# Patient Record
Sex: FEMALE | Race: BLACK OR AFRICAN AMERICAN | Employment: UNEMPLOYED | URBAN - METROPOLITAN AREA
[De-identification: names, ages, dates, MRNs, and addresses within clinical notes are randomized per-mention and may not be internally consistent; named-entity substitution may affect disease eponyms.]

---

## 2018-04-09 ENCOUNTER — ED HISTORICAL/CONVERTED ENCOUNTER (OUTPATIENT)
Dept: OTHER | Age: 58
End: 2018-04-09

## 2018-05-02 ENCOUNTER — IP HISTORICAL/CONVERTED ENCOUNTER (OUTPATIENT)
Dept: OTHER | Age: 58
End: 2018-05-02

## 2021-09-04 ENCOUNTER — HOSPITAL ENCOUNTER (INPATIENT)
Age: 61
LOS: 8 days | Discharge: HOME | DRG: 871 | End: 2021-09-12
Attending: EMERGENCY MEDICINE | Admitting: FAMILY MEDICINE
Payer: MEDICARE

## 2021-09-04 ENCOUNTER — APPOINTMENT (OUTPATIENT)
Dept: GENERAL RADIOLOGY | Age: 61
DRG: 871 | End: 2021-09-04
Attending: EMERGENCY MEDICINE
Payer: MEDICARE

## 2021-09-04 DIAGNOSIS — J12.82 PNEUMONIA DUE TO COVID-19 VIRUS: Primary | ICD-10-CM

## 2021-09-04 DIAGNOSIS — N18.9 CHRONIC KIDNEY DISEASE, UNSPECIFIED CKD STAGE: ICD-10-CM

## 2021-09-04 DIAGNOSIS — U07.1 PNEUMONIA DUE TO COVID-19 VIRUS: Primary | ICD-10-CM

## 2021-09-04 LAB
ALBUMIN SERPL-MCNC: 3.3 G/DL (ref 3.5–5)
ALBUMIN/GLOB SERPL: 0.6 {RATIO} (ref 1.1–2.2)
ALP SERPL-CCNC: 51 U/L (ref 45–117)
ALT SERPL-CCNC: 17 U/L (ref 12–78)
ANION GAP SERPL CALC-SCNC: 15 MMOL/L (ref 5–15)
AST SERPL-CCNC: 54 U/L (ref 15–37)
BASOPHILS # BLD: 0 K/UL (ref 0–0.1)
BASOPHILS NFR BLD: 0 % (ref 0–1)
BILIRUB SERPL-MCNC: 0.7 MG/DL (ref 0.2–1)
BUN SERPL-MCNC: 105 MG/DL (ref 6–20)
BUN/CREAT SERPL: 21 (ref 12–20)
CALCIUM SERPL-MCNC: 9.1 MG/DL (ref 8.5–10.1)
CHLORIDE SERPL-SCNC: 101 MMOL/L (ref 97–108)
CO2 SERPL-SCNC: 23 MMOL/L (ref 21–32)
CREAT SERPL-MCNC: 5.03 MG/DL (ref 0.55–1.02)
CRP SERPL-MCNC: 1.57 MG/DL (ref 0–0.6)
DIFFERENTIAL METHOD BLD: ABNORMAL
EOSINOPHIL # BLD: 0 K/UL (ref 0–0.4)
EOSINOPHIL NFR BLD: 0 % (ref 0–7)
ERYTHROCYTE [DISTWIDTH] IN BLOOD BY AUTOMATED COUNT: 13 % (ref 11.5–14.5)
FLUAV RNA SPEC QL NAA+PROBE: NOT DETECTED
FLUBV RNA SPEC QL NAA+PROBE: NOT DETECTED
GLOBULIN SER CALC-MCNC: 5.1 G/DL (ref 2–4)
GLUCOSE SERPL-MCNC: 127 MG/DL (ref 65–100)
HCT VFR BLD AUTO: 29.7 % (ref 35–47)
HGB BLD-MCNC: 9.6 G/DL (ref 11.5–16)
IMM GRANULOCYTES # BLD AUTO: 0.1 K/UL (ref 0–0.04)
IMM GRANULOCYTES NFR BLD AUTO: 2 % (ref 0–0.5)
LACTATE SERPL-SCNC: 1.4 MMOL/L (ref 0.4–2)
LDH SERPL L TO P-CCNC: 669 U/L (ref 81–246)
LYMPHOCYTES # BLD: 1 K/UL (ref 0.8–3.5)
LYMPHOCYTES NFR BLD: 11 % (ref 12–49)
MCH RBC QN AUTO: 31.9 PG (ref 26–34)
MCHC RBC AUTO-ENTMCNC: 32.3 G/DL (ref 30–36.5)
MCV RBC AUTO: 98.7 FL (ref 80–99)
MONOCYTES # BLD: 0.8 K/UL (ref 0–1)
MONOCYTES NFR BLD: 9 % (ref 5–13)
NEUTS SEG # BLD: 6.9 K/UL (ref 1.8–8)
NEUTS SEG NFR BLD: 78 % (ref 32–75)
NRBC # BLD: 0.06 K/UL (ref 0–0.01)
NRBC BLD-RTO: 0.7 PER 100 WBC
PLATELET # BLD AUTO: 231 K/UL (ref 150–400)
PMV BLD AUTO: 10.3 FL (ref 8.9–12.9)
POTASSIUM SERPL-SCNC: 4.3 MMOL/L (ref 3.5–5.1)
PROT SERPL-MCNC: 8.4 G/DL (ref 6.4–8.2)
RBC # BLD AUTO: 3.01 M/UL (ref 3.8–5.2)
SARS-COV-2, COV2: DETECTED
SODIUM SERPL-SCNC: 139 MMOL/L (ref 136–145)
TROPONIN I SERPL-MCNC: <0.05 NG/ML
WBC # BLD AUTO: 8.9 K/UL (ref 3.6–11)

## 2021-09-04 PROCEDURE — 87636 SARSCOV2 & INF A&B AMP PRB: CPT

## 2021-09-04 PROCEDURE — 84484 ASSAY OF TROPONIN QUANT: CPT

## 2021-09-04 PROCEDURE — 84145 PROCALCITONIN (PCT): CPT

## 2021-09-04 PROCEDURE — 71045 X-RAY EXAM CHEST 1 VIEW: CPT

## 2021-09-04 PROCEDURE — 87040 BLOOD CULTURE FOR BACTERIA: CPT

## 2021-09-04 PROCEDURE — 74011250637 HC RX REV CODE- 250/637: Performed by: EMERGENCY MEDICINE

## 2021-09-04 PROCEDURE — 99285 EMERGENCY DEPT VISIT HI MDM: CPT

## 2021-09-04 PROCEDURE — 96374 THER/PROPH/DIAG INJ IV PUSH: CPT

## 2021-09-04 PROCEDURE — 93005 ELECTROCARDIOGRAM TRACING: CPT

## 2021-09-04 PROCEDURE — 85025 COMPLETE CBC W/AUTO DIFF WBC: CPT

## 2021-09-04 PROCEDURE — 86140 C-REACTIVE PROTEIN: CPT

## 2021-09-04 PROCEDURE — 94640 AIRWAY INHALATION TREATMENT: CPT

## 2021-09-04 PROCEDURE — 36415 COLL VENOUS BLD VENIPUNCTURE: CPT

## 2021-09-04 PROCEDURE — 80053 COMPREHEN METABOLIC PANEL: CPT

## 2021-09-04 PROCEDURE — 77010033678 HC OXYGEN DAILY

## 2021-09-04 PROCEDURE — 83605 ASSAY OF LACTIC ACID: CPT

## 2021-09-04 PROCEDURE — 65270000029 HC RM PRIVATE

## 2021-09-04 PROCEDURE — 83615 LACTATE (LD) (LDH) ENZYME: CPT

## 2021-09-04 PROCEDURE — 74011250636 HC RX REV CODE- 250/636: Performed by: EMERGENCY MEDICINE

## 2021-09-04 RX ORDER — ALBUTEROL SULFATE 90 UG/1
10 AEROSOL, METERED RESPIRATORY (INHALATION) ONCE
Status: COMPLETED | OUTPATIENT
Start: 2021-09-04 | End: 2021-09-04

## 2021-09-04 RX ORDER — DEXAMETHASONE SODIUM PHOSPHATE 100 MG/10ML
6 INJECTION INTRAMUSCULAR; INTRAVENOUS
Status: COMPLETED | OUTPATIENT
Start: 2021-09-04 | End: 2021-09-04

## 2021-09-04 RX ORDER — ACETAMINOPHEN 500 MG
1000 TABLET ORAL
Status: COMPLETED | OUTPATIENT
Start: 2021-09-04 | End: 2021-09-04

## 2021-09-04 RX ADMIN — SODIUM CHLORIDE 1000 ML: 9 INJECTION, SOLUTION INTRAVENOUS at 18:41

## 2021-09-04 RX ADMIN — DEXAMETHASONE SODIUM PHOSPHATE 6 MG: 10 INJECTION INTRAMUSCULAR; INTRAVENOUS at 18:38

## 2021-09-04 RX ADMIN — ACETAMINOPHEN 1000 MG: 500 TABLET ORAL at 18:41

## 2021-09-04 RX ADMIN — ALBUTEROL SULFATE 10 PUFF: 90 AEROSOL, METERED RESPIRATORY (INHALATION) at 18:39

## 2021-09-04 NOTE — ED NOTES
Bedside and Verbal shift change report given to Valentino Martinez RN (oncoming nurse) by Radames Etienne RN (offgoing nurse). Report included the following information SBAR, ED Summary, MAR and Recent Results.

## 2021-09-04 NOTE — ED NOTES
Pt presents to ED ambulatory complaining of testing positive for Covid 19 with a home test 3 days ago and reporting increased shortness of breath along with increasing weakness. Pt is alert and oriented x 4, RR even and unlabored, skin is warm and dry. Assessment completed and pt updated on plan of care. Call bell in reach. Emergency Department Nursing Plan of Care       The Nursing Plan of Care is developed from the Nursing assessment and Emergency Department Attending provider initial evaluation. The plan of care may be reviewed in the ED Provider note.     The Plan of Care was developed with the following considerations:   Patient / Family readiness to learn indicated by:verbalized understanding  Persons(s) to be included in education: patient  Barriers to Learning/Limitations:No    Signed     Rafia Brito RN    9/4/2021   6:20 PM

## 2021-09-05 LAB
ALBUMIN SERPL-MCNC: 3.1 G/DL (ref 3.5–5)
ALBUMIN/GLOB SERPL: 0.6 {RATIO} (ref 1.1–2.2)
ALP SERPL-CCNC: 55 U/L (ref 45–117)
ALT SERPL-CCNC: 18 U/L (ref 12–78)
ANION GAP SERPL CALC-SCNC: 13 MMOL/L (ref 5–15)
AST SERPL-CCNC: 46 U/L (ref 15–37)
BILIRUB SERPL-MCNC: 0.6 MG/DL (ref 0.2–1)
BUN SERPL-MCNC: 91 MG/DL (ref 6–20)
BUN/CREAT SERPL: 21 (ref 12–20)
CALCIUM SERPL-MCNC: 9.1 MG/DL (ref 8.5–10.1)
CHLORIDE SERPL-SCNC: 106 MMOL/L (ref 97–108)
CO2 SERPL-SCNC: 22 MMOL/L (ref 21–32)
CREAT SERPL-MCNC: 4.42 MG/DL (ref 0.55–1.02)
D DIMER PPP FEU-MCNC: 2.3 MG/L FEU (ref 0–0.65)
ERYTHROCYTE [DISTWIDTH] IN BLOOD BY AUTOMATED COUNT: 13.1 % (ref 11.5–14.5)
FERRITIN SERPL-MCNC: ABNORMAL NG/ML (ref 8–252)
GLOBULIN SER CALC-MCNC: 5.3 G/DL (ref 2–4)
GLUCOSE SERPL-MCNC: 169 MG/DL (ref 65–100)
HCT VFR BLD AUTO: 28.9 % (ref 35–47)
HGB BLD-MCNC: 9.3 G/DL (ref 11.5–16)
LACTATE SERPL-SCNC: 1.4 MMOL/L (ref 0.4–2)
MCH RBC QN AUTO: 31.6 PG (ref 26–34)
MCHC RBC AUTO-ENTMCNC: 32.2 G/DL (ref 30–36.5)
MCV RBC AUTO: 98.3 FL (ref 80–99)
NRBC # BLD: 0.06 K/UL (ref 0–0.01)
NRBC BLD-RTO: 0.8 PER 100 WBC
PLATELET # BLD AUTO: 252 K/UL (ref 150–400)
PMV BLD AUTO: 10.2 FL (ref 8.9–12.9)
POTASSIUM SERPL-SCNC: 4.1 MMOL/L (ref 3.5–5.1)
PROCALCITONIN SERPL-MCNC: 0.54 NG/ML
PROT SERPL-MCNC: 8.4 G/DL (ref 6.4–8.2)
RBC # BLD AUTO: 2.94 M/UL (ref 3.8–5.2)
SODIUM SERPL-SCNC: 141 MMOL/L (ref 136–145)
TROPONIN I SERPL-MCNC: 0.05 NG/ML
WBC # BLD AUTO: 7.9 K/UL (ref 3.6–11)

## 2021-09-05 PROCEDURE — 84484 ASSAY OF TROPONIN QUANT: CPT

## 2021-09-05 PROCEDURE — 85027 COMPLETE CBC AUTOMATED: CPT

## 2021-09-05 PROCEDURE — 74011250636 HC RX REV CODE- 250/636: Performed by: STUDENT IN AN ORGANIZED HEALTH CARE EDUCATION/TRAINING PROGRAM

## 2021-09-05 PROCEDURE — 83605 ASSAY OF LACTIC ACID: CPT

## 2021-09-05 PROCEDURE — 77010033678 HC OXYGEN DAILY

## 2021-09-05 PROCEDURE — 82728 ASSAY OF FERRITIN: CPT

## 2021-09-05 PROCEDURE — 80053 COMPREHEN METABOLIC PANEL: CPT

## 2021-09-05 PROCEDURE — 65270000032 HC RM SEMIPRIVATE

## 2021-09-05 PROCEDURE — 85379 FIBRIN DEGRADATION QUANT: CPT

## 2021-09-05 PROCEDURE — 74011250637 HC RX REV CODE- 250/637: Performed by: STUDENT IN AN ORGANIZED HEALTH CARE EDUCATION/TRAINING PROGRAM

## 2021-09-05 PROCEDURE — 74011000258 HC RX REV CODE- 258: Performed by: STUDENT IN AN ORGANIZED HEALTH CARE EDUCATION/TRAINING PROGRAM

## 2021-09-05 PROCEDURE — 74011250636 HC RX REV CODE- 250/636: Performed by: FAMILY MEDICINE

## 2021-09-05 PROCEDURE — 36415 COLL VENOUS BLD VENIPUNCTURE: CPT

## 2021-09-05 RX ORDER — POLYETHYLENE GLYCOL 3350 17 G/17G
17 POWDER, FOR SOLUTION ORAL DAILY PRN
Status: DISCONTINUED | OUTPATIENT
Start: 2021-09-05 | End: 2021-09-05 | Stop reason: SDUPTHER

## 2021-09-05 RX ORDER — ASCORBIC ACID 500 MG
500 TABLET ORAL 2 TIMES DAILY
Status: DISCONTINUED | OUTPATIENT
Start: 2021-09-05 | End: 2021-09-12 | Stop reason: HOSPADM

## 2021-09-05 RX ORDER — ONDANSETRON 2 MG/ML
4 INJECTION INTRAMUSCULAR; INTRAVENOUS
Status: DISCONTINUED | OUTPATIENT
Start: 2021-09-05 | End: 2021-09-12 | Stop reason: HOSPADM

## 2021-09-05 RX ORDER — ONDANSETRON 4 MG/1
4 TABLET, ORALLY DISINTEGRATING ORAL
Status: DISCONTINUED | OUTPATIENT
Start: 2021-09-05 | End: 2021-09-12 | Stop reason: HOSPADM

## 2021-09-05 RX ORDER — ALLOPURINOL 100 MG/1
150 TABLET ORAL DAILY
COMMUNITY
Start: 2021-08-14 | End: 2021-09-12

## 2021-09-05 RX ORDER — SODIUM CHLORIDE 0.9 % (FLUSH) 0.9 %
5-40 SYRINGE (ML) INJECTION AS NEEDED
Status: DISCONTINUED | OUTPATIENT
Start: 2021-09-05 | End: 2021-09-05 | Stop reason: SDUPTHER

## 2021-09-05 RX ORDER — DEXAMETHASONE SODIUM PHOSPHATE 100 MG/10ML
6 INJECTION INTRAMUSCULAR; INTRAVENOUS EVERY 24 HOURS
Status: DISCONTINUED | OUTPATIENT
Start: 2021-09-05 | End: 2021-09-07 | Stop reason: ALTCHOICE

## 2021-09-05 RX ORDER — GUAIFENESIN/DEXTROMETHORPHAN 100-10MG/5
5 SYRUP ORAL
Status: DISCONTINUED | OUTPATIENT
Start: 2021-09-05 | End: 2021-09-12 | Stop reason: HOSPADM

## 2021-09-05 RX ORDER — ZINC SULFATE 50(220)MG
1 CAPSULE ORAL DAILY
Status: DISCONTINUED | OUTPATIENT
Start: 2021-09-05 | End: 2021-09-12 | Stop reason: HOSPADM

## 2021-09-05 RX ORDER — ACETAMINOPHEN 650 MG/1
650 SUPPOSITORY RECTAL
Status: DISCONTINUED | OUTPATIENT
Start: 2021-09-05 | End: 2021-09-12 | Stop reason: HOSPADM

## 2021-09-05 RX ORDER — SODIUM CHLORIDE 0.9 % (FLUSH) 0.9 %
5-40 SYRINGE (ML) INJECTION AS NEEDED
Status: DISCONTINUED | OUTPATIENT
Start: 2021-09-05 | End: 2021-09-12 | Stop reason: HOSPADM

## 2021-09-05 RX ORDER — ACETAMINOPHEN 325 MG/1
650 TABLET ORAL
Status: DISCONTINUED | OUTPATIENT
Start: 2021-09-05 | End: 2021-09-12 | Stop reason: HOSPADM

## 2021-09-05 RX ORDER — ALBUTEROL SULFATE 90 UG/1
2 AEROSOL, METERED RESPIRATORY (INHALATION)
Status: DISCONTINUED | OUTPATIENT
Start: 2021-09-05 | End: 2021-09-12 | Stop reason: HOSPADM

## 2021-09-05 RX ORDER — ENOXAPARIN SODIUM 100 MG/ML
30 INJECTION SUBCUTANEOUS EVERY 12 HOURS
Status: DISCONTINUED | OUTPATIENT
Start: 2021-09-05 | End: 2021-09-05 | Stop reason: DRUGHIGH

## 2021-09-05 RX ORDER — TORSEMIDE 20 MG/1
20 TABLET ORAL DAILY
Status: ON HOLD | COMMUNITY
Start: 2021-06-03 | End: 2021-09-05

## 2021-09-05 RX ORDER — TAMSULOSIN HYDROCHLORIDE 0.4 MG/1
0.4 CAPSULE ORAL DAILY
Status: DISCONTINUED | OUTPATIENT
Start: 2021-09-05 | End: 2021-09-12 | Stop reason: HOSPADM

## 2021-09-05 RX ORDER — HEPARIN SODIUM 5000 [USP'U]/ML
5000 INJECTION, SOLUTION INTRAVENOUS; SUBCUTANEOUS EVERY 8 HOURS
Status: DISCONTINUED | OUTPATIENT
Start: 2021-09-05 | End: 2021-09-12 | Stop reason: HOSPADM

## 2021-09-05 RX ORDER — AZITHROMYCIN 250 MG/1
500 TABLET, FILM COATED ORAL DAILY
Status: COMPLETED | OUTPATIENT
Start: 2021-09-05 | End: 2021-09-08

## 2021-09-05 RX ORDER — POLYETHYLENE GLYCOL 3350 17 G/17G
17 POWDER, FOR SOLUTION ORAL DAILY PRN
Status: DISCONTINUED | OUTPATIENT
Start: 2021-09-05 | End: 2021-09-12 | Stop reason: HOSPADM

## 2021-09-05 RX ORDER — SODIUM CHLORIDE 0.9 % (FLUSH) 0.9 %
5-40 SYRINGE (ML) INJECTION EVERY 8 HOURS
Status: DISCONTINUED | OUTPATIENT
Start: 2021-09-05 | End: 2021-09-06 | Stop reason: SDUPTHER

## 2021-09-05 RX ORDER — METOPROLOL TARTRATE 50 MG/1
50 TABLET ORAL 2 TIMES DAILY
Status: ON HOLD | COMMUNITY
Start: 2021-06-01 | End: 2021-09-12 | Stop reason: SDUPTHER

## 2021-09-05 RX ORDER — LOSARTAN POTASSIUM 50 MG/1
50 TABLET ORAL DAILY
COMMUNITY
Start: 2021-06-18 | End: 2021-09-12

## 2021-09-05 RX ORDER — MELATONIN
1000 DAILY
Status: ON HOLD | COMMUNITY
Start: 2021-06-03 | End: 2021-09-05

## 2021-09-05 RX ORDER — SODIUM CHLORIDE 0.9 % (FLUSH) 0.9 %
5-40 SYRINGE (ML) INJECTION EVERY 8 HOURS
Status: DISCONTINUED | OUTPATIENT
Start: 2021-09-05 | End: 2021-09-12 | Stop reason: HOSPADM

## 2021-09-05 RX ORDER — AMLODIPINE BESYLATE 10 MG/1
10 TABLET ORAL DAILY
COMMUNITY
Start: 2021-06-18

## 2021-09-05 RX ADMIN — Medication 10 ML: at 22:58

## 2021-09-05 RX ADMIN — DEXAMETHASONE SODIUM PHOSPHATE 6 MG: 10 INJECTION INTRAMUSCULAR; INTRAVENOUS at 14:25

## 2021-09-05 RX ADMIN — Medication 10 ML: at 06:00

## 2021-09-05 RX ADMIN — CEFTRIAXONE SODIUM 1 G: 1 INJECTION, POWDER, FOR SOLUTION INTRAMUSCULAR; INTRAVENOUS at 11:06

## 2021-09-05 RX ADMIN — ENOXAPARIN SODIUM 30 MG: 30 INJECTION SUBCUTANEOUS at 05:05

## 2021-09-05 RX ADMIN — Medication 10 ML: at 14:26

## 2021-09-05 RX ADMIN — Medication 1 CAPSULE: at 11:05

## 2021-09-05 RX ADMIN — HEPARIN SODIUM 5000 UNITS: 5000 INJECTION INTRAVENOUS; SUBCUTANEOUS at 14:25

## 2021-09-05 RX ADMIN — OXYCODONE HYDROCHLORIDE AND ACETAMINOPHEN 500 MG: 500 TABLET ORAL at 18:06

## 2021-09-05 RX ADMIN — TAMSULOSIN HYDROCHLORIDE 0.4 MG: 0.4 CAPSULE ORAL at 18:06

## 2021-09-05 RX ADMIN — AZITHROMYCIN 500 MG: 250 TABLET, FILM COATED ORAL at 11:06

## 2021-09-05 RX ADMIN — OXYCODONE HYDROCHLORIDE AND ACETAMINOPHEN 500 MG: 500 TABLET ORAL at 11:05

## 2021-09-05 RX ADMIN — HEPARIN SODIUM 5000 UNITS: 5000 INJECTION INTRAVENOUS; SUBCUTANEOUS at 22:48

## 2021-09-05 NOTE — ED NOTES
TRANSFER - OUT REPORT:    Verbal report given to Jhon Waterman RN (name) on Radha Posadas  being transferred to PCU (unit) for routine progression of care       Report consisted of patients Situation, Background, Assessment and   Recommendations(SBAR). Information from the following report(s) SBAR, ED Summary, Intake/Output, MAR and Recent Results was reviewed with the receiving nurse. Lines:   Peripheral IV 09/04/21 Right Hand (Active)   Site Assessment Clean, dry, & intact 09/04/21 1818   Phlebitis Assessment 0 09/04/21 1818   Infiltration Assessment 0 09/04/21 1818   Dressing Status Clean, dry, & intact 09/04/21 1818   Dressing Type Transparent 09/04/21 1818   Hub Color/Line Status Blue;Flushed 09/04/21 1818        Opportunity for questions and clarification was provided.       Patient transported with:   Monitor  Registered Nurse, 02 @4[facility]

## 2021-09-05 NOTE — PROGRESS NOTES
Hospitalist Progress Note    NAME: Marin Hunter   :  1960   MRN:  [id number]   Room Number:  Tim Dent  @ [facility]       Interim Hospital Summary: 64 y.o. female whom presented on 2021 with      Assessment / Plan:        #Acute hypoxic respiratory failure due to COVID-19  #COVID-19 pneumonia severe  #Severe sepsis  -Patient was saturating 88 to 90% on room air  -On admission temperature 102, heart rate above 100 respiratory rate 33  -Chest x-ray reviewed independently consistent with severe COVID-19 pneumonia  -COVID-19 PCR positive, CRP 1.57, procalcitonin 0.54, lactic acid within normal limit  troponin 0.05    -Vitamin C and zinc  -Dexamethasone 6 mg for 10 days  -Voluntary prone positioning  -Lovenox per protocol  -Trend symmetry markers  -Ceftriaxone and azithromycin for community-acquired pneumonia given procalcitonin 0.54  -Antitussive  -Antipyretic  -Hold off on December given advanced age CKD  -Supplemental oxygen to keep above SPO2 94      #JADE on CKD 4  -Patient follows [address] [place] [address] nephrology last creatinine in in the [facility] system was 3.94  -On admission creatinine 5.03 down to 4.42 after receiving 1 L of normal saline  -Monitor creatinine daily  -Hold losartan and Diamox  -Ins and outs  -Renal dose medication  -Avoid nephrotoxic l medication  -May nephrology consult    #Hypertension  -Takes amlodipine, metoprolol losartan and Diamox at home  -Resume amlodipine her blood pressure allows    #History of gout on allopurinol at home    #History history of AAA follows with vascular surgery as outpatient        Code status: Full  Prophylaxis: Lovenox  Recommended Disposition: Home w/Family     Subjective:     Chief Complaint / Reason for Physician Visit  Shortness of breath discussed with RN events overnight.      Review of Systems:  No fevers, chills, appetite change, cough, sputum production, shortness of breath, dyspnea on exertion, nausea, vomitting, diarrhea, constipation, chest pain, leg edema, abdominal pain, joint pain, rash, itching. Tolerating PT/OT. Tolerating diet. Objective:     VITALS:   Last 24hrs VS reviewed since prior progress note. Most recent are:  Patient Vitals for the past 24 hrs:   Temp Pulse Resp BP SpO2   09/05/21 0850 98.5 °F (36.9 °C) 79 22 127/62 93 %   09/05/21 0506 98.3 °F (36.8 °C) 97 29 112/65 93 %   09/05/21 0347  79      09/05/21 0320     93 %   09/05/21 0241     (!) 88 %   09/05/21 0206     90 %   09/05/21 0202 99.1 °F (37.3 °C) 98 (!) 32 127/64 90 %   09/05/21 0000  89 (!) 37 129/64 94 %   09/04/21 2345 98.4 °F (36.9 °C) 90 29 128/64 95 %   09/04/21 2300  91 (!) 32 128/64 97 %   09/04/21 2247     96 %   09/04/21 2222  93 29  96 %   09/04/21 2200  94 (!) 35 126/65 96 %   09/04/21 2118 99.2 °F (37.3 °C)       09/04/21 2100  99 (!) 33 128/64 97 %   09/04/21 2000  (!) 101 (!) 42 131/62 96 %   09/04/21 1839  95  (!) 119/50    09/04/21 1808     94 %   09/04/21 1750 (!) 102 °F (38.9 °C)       09/04/21 1747 (!) 102 °F (38.9 °C) 94 18 96/61 90 %       Intake/Output Summary (Last 24 hours) at 9/5/2021 0904  Last data filed at 9/5/2021 0506  Gross per 24 hour   Intake 1000 ml   Output 0 ml   Net 1000 ml        PHYSICAL EXAM:  General: WD, WN. Alert, cooperative, no acute distress    EENT:  EOMI. Anicteric sclerae. MMM  Resp:  CTA bilaterally, no wheezing or rales. No accessory muscle use  CV:  Regular  rhythm,  normal S1/S2, no murmurs rubs gallops, No edema  GI:  Soft, Non distended, Non tender. +Bowel sounds  Neurologic:  Alert and oriented X 3, normal speech,   Psych:   Good insight. Not anxious nor agitated  Skin:  No rashes.   No jaundice    Reviewed most current lab test results and cultures  YES  Reviewed most current radiology test results   YES  Review and summation of old records today    NO  Reviewed patient's current orders and MAR    YES  PMH/SH reviewed - no change compared to H&P  ________________________________________________________________________  Care Plan discussed with:    Comments   Patient x    Family      RN x    Care Manager x    Consultant                       x Multidiciplinary team rounds were held today with , nursing, pharmacist and clinical coordinator. Patient's plan of care was discussed; medications were reviewed and discharge planning was addressed. ________________________________________________________________________  Total NON critical care TIME: 35  Minutes    Total CRITICAL CARE TIME Spent:   Minutes non procedure based      Comments   >50% of visit spent in counseling and coordination of care x    ________________________________________________________________________  Dee Burch MD     Procedures: see electronic medical records for all procedures/Xrays and details which were not copied into this note but were reviewed prior to creation of Plan. LABS:  I reviewed today's most current labs and imaging studies.   Pertinent labs include:  Recent Labs     09/05/21 0218 09/04/21  1833   WBC 7.9 8.9   HGB 9.3* 9.6*   HCT 28.9* 29.7*    231     Recent Labs     09/05/21 0218 09/04/21  1833    139   K 4.1 4.3    101   CO2 22 23   * 127*   BUN 91* 105*   CREA 4.42* 5.03*   CA 9.1 9.1   ALB 3.1* 3.3*   TBILI 0.6 0.7   ALT 18 17       Signed: Dee Burch MD

## 2021-09-05 NOTE — ED NOTES
No changes to pt presentation at this time. Nursing supervisor updated. Pt has been on 4L O2 via NC since 1930 with no changes to resp rate.

## 2021-09-05 NOTE — PROGRESS NOTES
[facility] Admission Pharmacy Medication Reconciliation    Information obtained from:  Patient interview, RX Query  RxQuery data available1:y    Comments/recommendations:    1) The patient was interviewed regarding current PTA medication list, use and drug allergies. The patient is a good historian and was questioned regarding use of any other inhalers, topical products, over the counter medications, herbal medications, vitamin products or ophthalmic/nasal/otic medication use. 2) Medication changes to PTA list:    Added  none  Removed  Butalbital-APAP 50/325 tablet  Cholecalciferol 1000 units tablet  Torsemide 20 mg - MD instructed for her to stop \"a few days ago\"      3) [employer] () was accessed to determine fill history of any controlled medications:  No record of controlled medications dispensed in past 2 years       1RxQuery pharmacy benefit data reflects medications filled and processed through the patient's insurance, however                this data does NOT capture whether the medication was picked up or is currently being taken by the patient. Past Medical History/Disease States:  History reviewed. No pertinent past medical history. Patient allergies: Allergies as of 09/04/2021    (No Known Allergies)         Prior to Admission Medications   Prescriptions Last Dose Informant Patient Reported? Taking?   allopurinoL (ZYLOPRIM) 100 mg tablet 9/2/2021  Yes Yes   Sig: Take 150 mg by mouth daily. amLODIPine (NORVASC) 10 mg tablet 9/2/2021  Yes Yes   Sig: Take 10 mg by mouth daily. losartan (COZAAR) 50 mg tablet 9/2/2021  Yes Yes   Sig: Take 50 mg by mouth daily. metoprolol tartrate (LOPRESSOR) 50 mg tablet 9/2/2021  Yes Yes   Sig: Take 50 mg by mouth two (2) times a day.               Thank you,  Omid Elias, [facility]

## 2021-09-05 NOTE — ED NOTES
Nursing sup concerned that pt may be too acute for unit due to O2 requirements and RR. Asked us to wait a few mins to ensure pt is not decompensating. Recommended that she contact hospitalist if she does not feel pt is appropriate for upstairs.

## 2021-09-05 NOTE — PROGRESS NOTES
[facility] Dosing Services: Anticoagulation    Enoxaparin has been changed to heparin for a CrCl 14.2  mL/min. Pharmacy automatically makes dose adjustments for patients with a CrCl less than 30 mL/min.    64 y.o. female  Indication: prophylaxis of  DVT. Wt Readings from Last 1 Encounters:   09/05/21 75.8 kg (167 lb)       Ht Readings from Last 1 Encounters:   09/04/21 170.2 cm (67\")         Plan:  Enoxaparin changed to heparin 5,000 units subcutaneously every 8 hours. Previous  Regimen Enoxaparin 30 mg subcutaneously q12h   Creatinine Clearance Estimated Creatinine Clearance: 14.2 mL/min (A) (based on SCr of 4.42 mg/dL (H)).     Creatinine Lab Results   Component Value Date/Time    Creatinine 4.42 (H) 09/05/2021 02:18 AM       Platelet Lab Results   Component Value Date/Time    PLATELET 101 65/30/7757 02:18 AM      H/H Lab Results   Component Value Date/Time    HGB 9.3 (L) 09/05/2021 02:18 AM        Markell Nova RPH

## 2021-09-05 NOTE — PROGRESS NOTES
0130: Pt admitted from ED to [facility]. Pt O2 85% once we got her settled in bed. O2 turned up to 5L. Encouraged deep breathing and sitting upright. Disoriented to time, withdrawn but pleasant. 0200: Dual skin assessment completed. Pt incontinent of bowel, soft, brown/mitzy colored. Pt states she has a fistula in her L upper arm but denies dialysis and cannot recall when it was placed. Otherwise skin is intact. Partial bath completed, bed pads changed, electrodes changed and purewick initiated. Pt O2 at this time 92%. 0255: Labs sent. Two nurse attempt, pt is a difficult stick. 0300: RT contacted due to O2 maintaining between 86-92% on 5L. O2 increased to 6L. Humidified oxygen initiated. HR is labile from  however pt is tremulous. Will notify tele MD given that our threshold for nasal cannula is 1-4L on PCU.    0315: Update provided to pts daughter Adryan Frazier. She would like an update from MD today. 0: Spoke with Dr. Yumiko Goff 336, does not want to transfer pt at this point and instructed to escalate to Dr. Brook Saleh at shift change. Currently her HR is 96 and O2 93 still on 6LNC. Will continue to monitor. 0430: Pt tele alarming for -160. On lead II HR is only . Presumably detecting artifact due to pts tremors. 0500: Lovenox given. Resting comfortably. O2 94%, titrated down to [facility]. All other vitals remain stable. 0600: Resting comfortably on 5L maintaining between 88-94%.

## 2021-09-05 NOTE — PROGRESS NOTES
D/w RN, Pt O2 increased to 6LPM while sleeping, maintaining 93-94%. Pt is stable and resting at this time, but will need transfer to higher level of care in AM due to hospital policy requiring patients over 4LPM be transferred.

## 2021-09-05 NOTE — ED PROVIDER NOTES
EMERGENCY DEPARTMENT HISTORY AND PHYSICAL EXAM    Date: 9/4/2021  Patient Name: Calderon Rangel    History of Presenting Illness     Chief Complaint   Patient presents with    Positive For Covid-19         History Provided By: Patient    Chief Complaint: cough  Duration: 3 Days  Timing:  Acute  Quality: dry non productive  Severity: Moderate  Modifying Factors: none  Associated Symptoms: Of breath loss of taste and smell      HPI: Calderon Rangel is a 64 y.o. female with a PMH of CKD state 4 aortic aneurysm who presents with cough in the past 3 days. Patient also states she lost her sense of taste and smell. Patient's daughter purchased a home Covid test and patient and daughter tested positive. Patient states she has become increasingly weak and short of breath. PCP: None    Current Outpatient Medications   Medication Sig Dispense Refill    butalbital-acetaminophen (PHRENILIN)  mg tablet Take 1 Tab by mouth every six (6) hours as needed. (Patient not taking: Reported on 9/4/2021) 20 Tab 0    penicillin v potassium (VEETID) 500 mg tablet Take 1 Tab by mouth four (4) times daily. (Patient not taking: Reported on 9/4/2021) 40 Tab 0       Past History     Past Medical History:  History reviewed. No pertinent past medical history. Past Surgical History:  History reviewed. No pertinent surgical history. Family History:  History reviewed. No pertinent family history. Social History:  Social History     Tobacco Use    Smoking status: Never Smoker    Smokeless tobacco: Never Used   Substance Use Topics    Alcohol use: No    Drug use: No       Allergies:  No Known Allergies      Review of Systems   Review of Systems   Constitutional: Negative for fatigue and fever. HENT:        Loss of taste and smell   Respiratory: Positive for cough and shortness of breath. Negative for wheezing. Cardiovascular: Negative for chest pain. Gastrointestinal: Negative for abdominal pain. Musculoskeletal: Negative for arthralgias, myalgias, neck pain and neck stiffness. Skin: Negative for pallor and rash. Neurological: Negative for dizziness, tremors and headaches. All other systems reviewed and are negative. Physical Exam     Vitals:    09/04/21 1750 09/04/21 1808 09/04/21 1839 09/04/21 2000   BP:   (!) 119/50 131/62   Pulse:   95 (!) 101   Resp:    (!) 42   Temp: (!) 102 °F (38.9 °C)      SpO2:  94%  96%   Weight:       Height:         Physical Exam  Vitals and nursing note reviewed. Constitutional:       General: She is not in acute distress. Appearance: She is well-developed and normal weight. She is ill-appearing. HENT:      Head: Normocephalic and atraumatic. Right Ear: External ear normal.      Left Ear: External ear normal.      Nose: Nose normal.   Eyes:      Conjunctiva/sclera: Conjunctivae normal.   Cardiovascular:      Rate and Rhythm: Normal rate and regular rhythm. Heart sounds: Normal heart sounds. Pulmonary:      Effort: Pulmonary effort is normal. No respiratory distress. Breath sounds: Normal breath sounds. No wheezing. Comments: Breath sounds diminished respiratory rate 42  Abdominal:      General: Bowel sounds are normal.      Palpations: Abdomen is soft. Tenderness: There is no abdominal tenderness. Musculoskeletal:         General: Normal range of motion. Cervical back: Normal range of motion and neck supple. Lymphadenopathy:      Cervical: No cervical adenopathy. Skin:     General: Skin is warm and dry. Findings: No rash. Neurological:      Mental Status: She is alert and oriented to person, place, and time. Cranial Nerves: No cranial nerve deficit. Coordination: Coordination normal.   Psychiatric:         Behavior: Behavior normal.         Thought Content:  Thought content normal.         Judgment: Judgment normal.           Diagnostic Study Results     Labs -     Recent Results (from the past 12 hour(s))   CBC WITH AUTOMATED DIFF    Collection Time: 09/04/21  6:33 PM   Result Value Ref Range    WBC 8.9 3.6 - 11.0 K/uL    RBC 3.01 (L) 3.80 - 5.20 M/uL    HGB 9.6 (L) 11.5 - 16.0 g/dL    HCT 29.7 (L) 35.0 - 47.0 %    MCV 98.7 80.0 - 99.0 FL    MCH 31.9 26.0 - 34.0 PG    MCHC 32.3 30.0 - 36.5 g/dL    RDW 13.0 11.5 - 14.5 %    PLATELET 428 585 - 334 K/uL    MPV 10.3 8.9 - 12.9 FL    NRBC 0.7 (H) 0  WBC    ABSOLUTE NRBC 0.06 (H) 0.00 - 0.01 K/uL    NEUTROPHILS 78 (H) 32 - 75 %    LYMPHOCYTES 11 (L) 12 - 49 %    MONOCYTES 9 5 - 13 %    EOSINOPHILS 0 0 - 7 %    BASOPHILS 0 0 - 1 %    IMMATURE GRANULOCYTES 2 (H) 0.0 - 0.5 %    ABS. NEUTROPHILS 6.9 1.8 - 8.0 K/UL    ABS. LYMPHOCYTES 1.0 0.8 - 3.5 K/UL    ABS. MONOCYTES 0.8 0.0 - 1.0 K/UL    ABS. EOSINOPHILS 0.0 0.0 - 0.4 K/UL    ABS. BASOPHILS 0.0 0.0 - 0.1 K/UL    ABS. IMM. GRANS. 0.1 (H) 0.00 - 0.04 K/UL    DF AUTOMATED     METABOLIC PANEL, COMPREHENSIVE    Collection Time: 09/04/21  6:33 PM   Result Value Ref Range    Sodium 139 136 - 145 mmol/L    Potassium 4.3 3.5 - 5.1 mmol/L    Chloride 101 97 - 108 mmol/L    CO2 23 21 - 32 mmol/L    Anion gap 15 5 - 15 mmol/L    Glucose 127 (H) 65 - 100 mg/dL     (H) 6 - 20 MG/DL    Creatinine 5.03 (H) 0.55 - 1.02 MG/DL    BUN/Creatinine ratio 21 (H) 12 - 20      GFR est AA 11 (L) >60 ml/min/1.73m2    GFR est non-AA 9 (L) >60 ml/min/1.73m2    Calcium 9.1 8.5 - 10.1 MG/DL    Bilirubin, total 0.7 0.2 - 1.0 MG/DL    ALT (SGPT) 17 12 - 78 U/L    AST (SGOT) 54 (H) 15 - 37 U/L    Alk.  phosphatase 51 45 - 117 U/L    Protein, total 8.4 (H) 6.4 - 8.2 g/dL    Albumin 3.3 (L) 3.5 - 5.0 g/dL    Globulin 5.1 (H) 2.0 - 4.0 g/dL    A-G Ratio 0.6 (L) 1.1 - 2.2     TROPONIN I    Collection Time: 09/04/21  6:33 PM   Result Value Ref Range    Troponin-I, Qt. <0.05 <0.05 ng/mL   LD    Collection Time: 09/04/21  6:33 PM   Result Value Ref Range     (H) 81 - 246 U/L   LACTIC ACID    Collection Time: 09/04/21  6:33 PM   Result Value Ref Range    Lactic acid 1.4 0.4 - 2.0 MMOL/L   COVID-19 WITH INFLUENZA A/B    Collection Time: 09/04/21  7:23 PM   Result Value Ref Range    SARS-CoV-2 Detected (AA) NOTD      Influenza A by PCR Not detected      Influenza B by PCR Not detected     EKG, 12 LEAD, INITIAL    Collection Time: 09/04/21  8:28 PM   Result Value Ref Range    Ventricular Rate 105 BPM    Atrial Rate 105 BPM    P-R Interval 168 ms    QRS Duration 80 ms    Q-T Interval 314 ms    QTC Calculation (Bezet) 415 ms    Calculated P Axis 36 degrees    Calculated R Axis 1 degrees    Calculated T Axis -4 degrees    Diagnosis       Sinus tachycardia  Cannot rule out Anterior infarct , age undetermined  Abnormal ECG  No previous ECGs available         Radiologic Studies -   XR CHEST PORT   Final Result   Extensive bilateral pulmonary groundglass opacities suspicious for   COVID-19 pneumonia. CT Results  (Last 48 hours)    None        CXR Results  (Last 48 hours)               09/04/21 1906  XR CHEST PORT Final result    Impression:  Extensive bilateral pulmonary groundglass opacities suspicious for   COVID-19 pneumonia. Narrative:  EXAM: XR CHEST PORT       INDICATION: COVID       COMPARISON: Chest x-ray 5/2/2018       FINDINGS: A portable AP radiograph of the chest was obtained at 1846 hours. There are several areas of pulmonary groundglass opacification scattered   throughout the left lung and in the right lower lung. There is no pneumothorax   or pleural effusion. Cardiac size is within normal limits. There is mild   thoracic aortic tortuosity. Mediastinal and hilar contour otherwise within   normal limits. Medical Decision Making   I am the first provider for this patient. I reviewed the vital signs, available nursing notes, past medical history, past surgical history, family history and social history. Vital Signs-Reviewed the patient's vital signs.     Records Reviewed: Nursing Notes    Provider Notes (Medical Decision Making):    59-year-old female presents with positive Covid home test increasing shortness of breath. Will order labs most likely Covid pneumonia will admit. DDX Covid pneumonia PE bronchitis upper respiratory infection  Disposition:  9:07 PM  Patient is being admitted to the hospital.  The results of their tests and reasons for their admission have been discussed with them and/or available family. They convey agreement and understanding for the need to be admitted and for their admission diagnosis. Consultation has been made with the inpatient physician specialist for hospitalization. LABORATORY TESTS:  Recent Results (from the past 12 hour(s))   CBC WITH AUTOMATED DIFF    Collection Time: 09/04/21  6:33 PM   Result Value Ref Range    WBC 8.9 3.6 - 11.0 K/uL    RBC 3.01 (L) 3.80 - 5.20 M/uL    HGB 9.6 (L) 11.5 - 16.0 g/dL    HCT 29.7 (L) 35.0 - 47.0 %    MCV 98.7 80.0 - 99.0 FL    MCH 31.9 26.0 - 34.0 PG    MCHC 32.3 30.0 - 36.5 g/dL    RDW 13.0 11.5 - 14.5 %    PLATELET 208 028 - 388 K/uL    MPV 10.3 8.9 - 12.9 FL    NRBC 0.7 (H) 0  WBC    ABSOLUTE NRBC 0.06 (H) 0.00 - 0.01 K/uL    NEUTROPHILS 78 (H) 32 - 75 %    LYMPHOCYTES 11 (L) 12 - 49 %    MONOCYTES 9 5 - 13 %    EOSINOPHILS 0 0 - 7 %    BASOPHILS 0 0 - 1 %    IMMATURE GRANULOCYTES 2 (H) 0.0 - 0.5 %    ABS. NEUTROPHILS 6.9 1.8 - 8.0 K/UL    ABS. LYMPHOCYTES 1.0 0.8 - 3.5 K/UL    ABS. MONOCYTES 0.8 0.0 - 1.0 K/UL    ABS. EOSINOPHILS 0.0 0.0 - 0.4 K/UL    ABS. BASOPHILS 0.0 0.0 - 0.1 K/UL    ABS. IMM.  GRANS. 0.1 (H) 0.00 - 0.04 K/UL    DF AUTOMATED     METABOLIC PANEL, COMPREHENSIVE    Collection Time: 09/04/21  6:33 PM   Result Value Ref Range    Sodium 139 136 - 145 mmol/L    Potassium 4.3 3.5 - 5.1 mmol/L    Chloride 101 97 - 108 mmol/L    CO2 23 21 - 32 mmol/L    Anion gap 15 5 - 15 mmol/L    Glucose 127 (H) 65 - 100 mg/dL     (H) 6 - 20 MG/DL    Creatinine 5.03 (H) 0.55 - 1.02 MG/DL    BUN/Creatinine ratio 21 (H) 12 - 20      GFR est AA 11 (L) >60 ml/min/1.73m2    GFR est non-AA 9 (L) >60 ml/min/1.73m2    Calcium 9.1 8.5 - 10.1 MG/DL    Bilirubin, total 0.7 0.2 - 1.0 MG/DL    ALT (SGPT) 17 12 - 78 U/L    AST (SGOT) 54 (H) 15 - 37 U/L    Alk. phosphatase 51 45 - 117 U/L    Protein, total 8.4 (H) 6.4 - 8.2 g/dL    Albumin 3.3 (L) 3.5 - 5.0 g/dL    Globulin 5.1 (H) 2.0 - 4.0 g/dL    A-G Ratio 0.6 (L) 1.1 - 2.2     TROPONIN I    Collection Time: 09/04/21  6:33 PM   Result Value Ref Range    Troponin-I, Qt. <0.05 <0.05 ng/mL   LD    Collection Time: 09/04/21  6:33 PM   Result Value Ref Range     (H) 81 - 246 U/L   LACTIC ACID    Collection Time: 09/04/21  6:33 PM   Result Value Ref Range    Lactic acid 1.4 0.4 - 2.0 MMOL/L   COVID-19 WITH INFLUENZA A/B    Collection Time: 09/04/21  7:23 PM   Result Value Ref Range    SARS-CoV-2 Detected (AA) NOTD      Influenza A by PCR Not detected      Influenza B by PCR Not detected     EKG, 12 LEAD, INITIAL    Collection Time: 09/04/21  8:28 PM   Result Value Ref Range    Ventricular Rate 105 BPM    Atrial Rate 105 BPM    P-R Interval 168 ms    QRS Duration 80 ms    Q-T Interval 314 ms    QTC Calculation (Bezet) 415 ms    Calculated P Axis 36 degrees    Calculated R Axis 1 degrees    Calculated T Axis -4 degrees    Diagnosis       Sinus tachycardia  Cannot rule out Anterior infarct , age undetermined  Abnormal ECG  No previous ECGs available         IMAGING RESULTS:  XR CHEST PORT   Final Result   Extensive bilateral pulmonary groundglass opacities suspicious for   COVID-19 pneumonia. XR CHEST PORT    Result Date: 9/4/2021  EXAM: XR CHEST PORT INDICATION: COVID COMPARISON: Chest x-ray 5/2/2018 FINDINGS: A portable AP radiograph of the chest was obtained at 1846 hours. There are several areas of pulmonary groundglass opacification scattered throughout the left lung and in the right lower lung. There is no pneumothorax or pleural effusion. Cardiac size is within normal limits.  There is mild thoracic aortic tortuosity. Mediastinal and hilar contour otherwise within normal limits. Extensive bilateral pulmonary groundglass opacities suspicious for COVID-19 pneumonia. MEDICATIONS GIVEN:  Medications   dexamethasone (DECADRON) 10 mg/mL injection 6 mg (6 mg IntraVENous Given 9/4/21 1838)   albuterol (PROVENTIL HFA, VENTOLIN HFA, PROAIR HFA) inhaler 10 Puff (10 Puffs Inhalation Given 9/4/21 1839)   acetaminophen (TYLENOL) tablet 1,000 mg (1,000 mg Oral Given 9/4/21 1841)   sodium chloride 0.9 % bolus infusion 1,000 mL (0 mL IntraVENous IV Completed 9/4/21 2039)       IMPRESSION:  1. Pneumonia due to COVID-19 virus    2. Chronic kidney disease, unspecified CKD stage        PLAN:  1. Admit to Dr Sherrill Huston             Procedures:  Procedures    Please note that this dictation was completed with Dragon, computer voice recognition software. Quite often unanticipated grammatical, syntax, homophones, and other interpretive errors are inadvertently transcribed by the computer software. Please disregard these errors. Additionally, please excuse any errors that have escaped final proofreading. Diagnosis     Clinical Impression:   1. Pneumonia due to COVID-19 virus    2.  Chronic kidney disease, unspecified CKD stage

## 2021-09-05 NOTE — H&P
BRIEF PHYSICIAN ADMIT NOTE    Name: Sarah Vivas    :   1960    MRN:   [id number]    PRIMARY PROVIDER: None    DATE AND TIME: 2021 10:07 PM    CHIEF COMPLAINT: SOB    HISTORY OF PRESENT ILLNESS:  Valdez Reagan is a 64 y.o.  female whom presents with complaint noted above. Patient reports worsening dyspnea over the course of the past 3 days; she has been experiencing an associated cough; she endorses a loss of taste and smell; vaccination status not known to me. PAST MEDICAL HISTORY:   History reviewed. No pertinent past medical history. No current facility-administered medications on file prior to encounter. Current Outpatient Medications on File Prior to Encounter   Medication Sig Dispense Refill    butalbital-acetaminophen (PHRENILIN)  mg tablet Take 1 Tab by mouth every six (6) hours as needed. (Patient not taking: Reported on 2021) 20 Tab 0    penicillin v potassium (VEETID) 500 mg tablet Take 1 Tab by mouth four (4) times daily. (Patient not taking: Reported on 2021) 40 Tab 0     No Known Allergies  Social History     Socioeconomic History    Marital status:      Spouse name: Not on file    Number of children: Not on file    Years of education: Not on file    Highest education level: Not on file   Occupational History    Not on file   Tobacco Use    Smoking status: Never Smoker    Smokeless tobacco: Never Used   Substance and Sexual Activity    Alcohol use: No    Drug use: No    Sexual activity: Yes     Partners: Male   Other Topics Concern    Not on file   Social History Narrative    Not on file     Social Determinants of Health     Financial Resource Strain:     Difficulty of Paying Living Expenses:    Food Insecurity:     Worried About Running Out of Food in the Last Year:     [address] in the Last Year:    Transportation Needs:     Lack of Transportation (Medical):      Lack of Transportation (Non-Medical):    Physical Activity:     Days of Exercise per Week:     Minutes of Exercise per Session:    Stress:     Feeling of Stress :    Social Connections:     Frequency of Communication with Friends and Family:     Frequency of Social Gatherings with Friends and Family:     Attends Bahai Services:     Active Member of Clubs or Organizations:     Attends Club or Organization Meetings:     Marital Status:    Intimate Partner Violence:     Fear of Current or Ex-Partner:     Emotionally Abused:     Physically Abused:     Sexually Abused:      History reviewed. No pertinent family history. Review of Systems   Respiratory: Positive for cough and shortness of breath. Negative for hemoptysis. All other systems reviewed and are negative. Visit Vitals  /65   Pulse 93   Temp 99.2 °F (37.3 °C)   Resp 29   Ht 5' 7\" (1.702 m)   Wt 75.9 kg (167 lb 6.4 oz)   SpO2 96%   BMI 26.22 kg/m²     Recent Results (from the past 12 hour(s))   CBC WITH AUTOMATED DIFF    Collection Time: 09/04/21  6:33 PM   Result Value Ref Range    WBC 8.9 3.6 - 11.0 K/uL    RBC 3.01 (L) 3.80 - 5.20 M/uL    HGB 9.6 (L) 11.5 - 16.0 g/dL    HCT 29.7 (L) 35.0 - 47.0 %    MCV 98.7 80.0 - 99.0 FL    MCH 31.9 26.0 - 34.0 PG    MCHC 32.3 30.0 - 36.5 g/dL    RDW 13.0 11.5 - 14.5 %    PLATELET 436 235 - 755 K/uL    MPV 10.3 8.9 - 12.9 FL    NRBC 0.7 (H) 0  WBC    ABSOLUTE NRBC 0.06 (H) 0.00 - 0.01 K/uL    NEUTROPHILS 78 (H) 32 - 75 %    LYMPHOCYTES 11 (L) 12 - 49 %    MONOCYTES 9 5 - 13 %    EOSINOPHILS 0 0 - 7 %    BASOPHILS 0 0 - 1 %    IMMATURE GRANULOCYTES 2 (H) 0.0 - 0.5 %    ABS. NEUTROPHILS 6.9 1.8 - 8.0 K/UL    ABS. LYMPHOCYTES 1.0 0.8 - 3.5 K/UL    ABS. MONOCYTES 0.8 0.0 - 1.0 K/UL    ABS. EOSINOPHILS 0.0 0.0 - 0.4 K/UL    ABS. BASOPHILS 0.0 0.0 - 0.1 K/UL    ABS. IMM.  GRANS. 0.1 (H) 0.00 - 0.04 K/UL    DF AUTOMATED     METABOLIC PANEL, COMPREHENSIVE    Collection Time: 09/04/21  6:33 PM   Result Value Ref Range    Sodium 139 136 - 145 mmol/L    Potassium 4.3 3.5 - 5.1 mmol/L    Chloride 101 97 - 108 mmol/L    CO2 23 21 - 32 mmol/L    Anion gap 15 5 - 15 mmol/L    Glucose 127 (H) 65 - 100 mg/dL     (H) 6 - 20 MG/DL    Creatinine 5.03 (H) 0.55 - 1.02 MG/DL    BUN/Creatinine ratio 21 (H) 12 - 20      GFR est AA 11 (L) >60 ml/min/1.73m2    GFR est non-AA 9 (L) >60 ml/min/1.73m2    Calcium 9.1 8.5 - 10.1 MG/DL    Bilirubin, total 0.7 0.2 - 1.0 MG/DL    ALT (SGPT) 17 12 - 78 U/L    AST (SGOT) 54 (H) 15 - 37 U/L    Alk.  phosphatase 51 45 - 117 U/L    Protein, total 8.4 (H) 6.4 - 8.2 g/dL    Albumin 3.3 (L) 3.5 - 5.0 g/dL    Globulin 5.1 (H) 2.0 - 4.0 g/dL    A-G Ratio 0.6 (L) 1.1 - 2.2     TROPONIN I    Collection Time: 09/04/21  6:33 PM   Result Value Ref Range    Troponin-I, Qt. <0.05 <0.05 ng/mL   LD    Collection Time: 09/04/21  6:33 PM   Result Value Ref Range     (H) 81 - 246 U/L   LACTIC ACID    Collection Time: 09/04/21  6:33 PM   Result Value Ref Range    Lactic acid 1.4 0.4 - 2.0 MMOL/L   COVID-19 WITH INFLUENZA A/B    Collection Time: 09/04/21  7:23 PM   Result Value Ref Range    SARS-CoV-2 Detected (AA) NOTD      Influenza A by PCR Not detected      Influenza B by PCR Not detected     EKG, 12 LEAD, INITIAL    Collection Time: 09/04/21  8:28 PM   Result Value Ref Range    Ventricular Rate 105 BPM    Atrial Rate 105 BPM    P-R Interval 168 ms    QRS Duration 80 ms    Q-T Interval 314 ms    QTC Calculation (Bezet) 415 ms    Calculated P Axis 36 degrees    Calculated R Axis 1 degrees    Calculated T Axis -4 degrees    Diagnosis       Sinus tachycardia  Cannot rule out Anterior infarct , age undetermined  Abnormal ECG  No previous ECGs available           ADMIT TO MEDICINE AS IP; ANTICIPATED LOS > 2 MN    ASSESSMENT:      # COVID 19 PNA/PNitis  - Initiate systemic steroid therapy with Dexamethasone  - Consult with ID in AM to determine if Remdesivir therapy is appropriate based on timeline of illness  - Defer IV Abx therapy; no indication of bacterial co-infection  - BD, bronchial hygeine per RT protocol  - Supplemental O2; titrate to maintain SaO2 > 90%  - Monitor S.lactate, Ferritin, D-dimer serially    # JADE on CKD; baseline eGFR not available  - Patient was cautiously hydration in ER; will monitor renal metabolics serially; avoid exposure to nephrotoxic agents    # Anemia; chronic disease  - Monitor H/H serially; transfusion not indicated    # Medical records requested; reviewed in detail    # DVT/GI PPx  - Lovenox SQ        PATIENT [facility] with expected discharge or transfer of Acute Care Inpatients with in 96 hours of Admission.      ADMISSION DATE AND TIME: 9/4/2021  5:42 PM  Inpatient,2 midnights or more    PLAN DISCHARGE TRANSFER TO:  Vanessa Reddy MD            Services were provided to the patient in accordance with admission requirements found in Title 42 Section 412.3 of the Code of [employer]

## 2021-09-06 LAB
ANION GAP SERPL CALC-SCNC: 17 MMOL/L (ref 5–15)
ATRIAL RATE: 105 BPM
BASOPHILS # BLD: 0 K/UL (ref 0–0.1)
BASOPHILS NFR BLD: 0 % (ref 0–1)
BUN SERPL-MCNC: 82 MG/DL (ref 6–20)
BUN/CREAT SERPL: 25 (ref 12–20)
CALCIUM SERPL-MCNC: 8.8 MG/DL (ref 8.5–10.1)
CALCULATED P AXIS, ECG09: 36 DEGREES
CALCULATED R AXIS, ECG10: 1 DEGREES
CALCULATED T AXIS, ECG11: -4 DEGREES
CHLORIDE SERPL-SCNC: 109 MMOL/L (ref 97–108)
CO2 SERPL-SCNC: 21 MMOL/L (ref 21–32)
CREAT SERPL-MCNC: 3.28 MG/DL (ref 0.55–1.02)
CRP SERPL-MCNC: 1.17 MG/DL (ref 0–0.6)
D DIMER PPP FEU-MCNC: 1.12 MG/L FEU (ref 0–0.65)
DIAGNOSIS, 93000: NORMAL
DIFFERENTIAL METHOD BLD: ABNORMAL
EOSINOPHIL # BLD: 0 K/UL (ref 0–0.4)
EOSINOPHIL NFR BLD: 0 % (ref 0–7)
ERYTHROCYTE [DISTWIDTH] IN BLOOD BY AUTOMATED COUNT: 13.1 % (ref 11.5–14.5)
GLUCOSE SERPL-MCNC: 138 MG/DL (ref 65–100)
HCT VFR BLD AUTO: 29 % (ref 35–47)
HGB BLD-MCNC: 9.2 G/DL (ref 11.5–16)
IMM GRANULOCYTES # BLD AUTO: 0.7 K/UL (ref 0–0.04)
IMM GRANULOCYTES NFR BLD AUTO: 5 % (ref 0–0.5)
LYMPHOCYTES # BLD: 1.4 K/UL (ref 0.8–3.5)
LYMPHOCYTES NFR BLD: 10 % (ref 12–49)
MCH RBC QN AUTO: 31.5 PG (ref 26–34)
MCHC RBC AUTO-ENTMCNC: 31.7 G/DL (ref 30–36.5)
MCV RBC AUTO: 99.3 FL (ref 80–99)
MONOCYTES # BLD: 1.7 K/UL (ref 0–1)
MONOCYTES NFR BLD: 12 % (ref 5–13)
NEUTS SEG # BLD: 10.3 K/UL (ref 1.8–8)
NEUTS SEG NFR BLD: 73 % (ref 32–75)
NRBC # BLD: 0.12 K/UL (ref 0–0.01)
NRBC BLD-RTO: 0.9 PER 100 WBC
P-R INTERVAL, ECG05: 168 MS
PLATELET # BLD AUTO: 287 K/UL (ref 150–400)
PMV BLD AUTO: 10.1 FL (ref 8.9–12.9)
POTASSIUM SERPL-SCNC: 3.8 MMOL/L (ref 3.5–5.1)
PROCALCITONIN SERPL-MCNC: 0.25 NG/ML
Q-T INTERVAL, ECG07: 314 MS
QRS DURATION, ECG06: 80 MS
QTC CALCULATION (BEZET), ECG08: 415 MS
RBC # BLD AUTO: 2.92 M/UL (ref 3.8–5.2)
RBC MORPH BLD: ABNORMAL
SODIUM SERPL-SCNC: 147 MMOL/L (ref 136–145)
VENTRICULAR RATE, ECG03: 105 BPM
WBC # BLD AUTO: 14.1 K/UL (ref 3.6–11)

## 2021-09-06 PROCEDURE — 65270000032 HC RM SEMIPRIVATE

## 2021-09-06 PROCEDURE — 86140 C-REACTIVE PROTEIN: CPT

## 2021-09-06 PROCEDURE — 74011250637 HC RX REV CODE- 250/637: Performed by: STUDENT IN AN ORGANIZED HEALTH CARE EDUCATION/TRAINING PROGRAM

## 2021-09-06 PROCEDURE — 77010033678 HC OXYGEN DAILY

## 2021-09-06 PROCEDURE — 74011000258 HC RX REV CODE- 258: Performed by: STUDENT IN AN ORGANIZED HEALTH CARE EDUCATION/TRAINING PROGRAM

## 2021-09-06 PROCEDURE — 85025 COMPLETE CBC W/AUTO DIFF WBC: CPT

## 2021-09-06 PROCEDURE — 74011250636 HC RX REV CODE- 250/636: Performed by: STUDENT IN AN ORGANIZED HEALTH CARE EDUCATION/TRAINING PROGRAM

## 2021-09-06 PROCEDURE — 84145 PROCALCITONIN (PCT): CPT

## 2021-09-06 PROCEDURE — 36415 COLL VENOUS BLD VENIPUNCTURE: CPT

## 2021-09-06 PROCEDURE — 74011250636 HC RX REV CODE- 250/636: Performed by: FAMILY MEDICINE

## 2021-09-06 PROCEDURE — 80048 BASIC METABOLIC PNL TOTAL CA: CPT

## 2021-09-06 PROCEDURE — 85379 FIBRIN DEGRADATION QUANT: CPT

## 2021-09-06 RX ADMIN — AZITHROMYCIN 500 MG: 250 TABLET, FILM COATED ORAL at 10:45

## 2021-09-06 RX ADMIN — OXYCODONE HYDROCHLORIDE AND ACETAMINOPHEN 500 MG: 500 TABLET ORAL at 17:42

## 2021-09-06 RX ADMIN — Medication 10 ML: at 22:56

## 2021-09-06 RX ADMIN — Medication 10 ML: at 15:12

## 2021-09-06 RX ADMIN — HEPARIN SODIUM 5000 UNITS: 5000 INJECTION INTRAVENOUS; SUBCUTANEOUS at 05:47

## 2021-09-06 RX ADMIN — Medication 10 ML: at 06:00

## 2021-09-06 RX ADMIN — Medication 1 CAPSULE: at 08:36

## 2021-09-06 RX ADMIN — CEFTRIAXONE SODIUM 1 G: 1 INJECTION, POWDER, FOR SOLUTION INTRAMUSCULAR; INTRAVENOUS at 10:46

## 2021-09-06 RX ADMIN — HEPARIN SODIUM 5000 UNITS: 5000 INJECTION INTRAVENOUS; SUBCUTANEOUS at 22:55

## 2021-09-06 RX ADMIN — TAMSULOSIN HYDROCHLORIDE 0.4 MG: 0.4 CAPSULE ORAL at 08:36

## 2021-09-06 RX ADMIN — DEXAMETHASONE SODIUM PHOSPHATE 6 MG: 10 INJECTION INTRAMUSCULAR; INTRAVENOUS at 15:12

## 2021-09-06 RX ADMIN — OXYCODONE HYDROCHLORIDE AND ACETAMINOPHEN 500 MG: 500 TABLET ORAL at 08:36

## 2021-09-06 RX ADMIN — HEPARIN SODIUM 5000 UNITS: 5000 INJECTION INTRAVENOUS; SUBCUTANEOUS at 15:10

## 2021-09-06 NOTE — PROGRESS NOTES
0200: Pt resting comfortably O2 92-94% on 5L. O2 weaned to 4L. 0250: O2 titrated back to 5L.    0400: Unable to obtain AM labs. Will have a second nurse attempt.

## 2021-09-06 NOTE — PROGRESS NOTES
Planning  1/ PCP follow-up appointment to be scheduled      Reason for Admission: HISTORY OF PRESENT ILLNESS:  Wyatt King is a 64 y.o.  female whom presents with complaint noted above. Patient reports worsening dyspnea over the course of the past 3 days; she has been experiencing an associated cough; she endorses a loss of taste and smell; vaccination status not known to me. RUR Score:   13%                  Plan for utilizing home health:   Not at this time     PCP: First and Last name:  Dr. Bernarda Hunter   Name of Practice:    Are you a current patient: Yes/No:    Approximate date of last visit:    Can you participate in a virtual visit with your PCP:                     Current Advanced Directive/Advance Care Plan: Full Code  Wants CPR/ventilation. Healthcare decision maker is her Joleen Wood [phone]. Healthcare Decision Maker:   Click here to complete [address] including selection of the Healthcare Decision Maker Relationship (ie \"Primary\")                             Transition of Care Plan:       Lives with daughter Monserrat Eugene. Address:  [address] [place], [address]    [phone]    Talked with patient about IM letter. Patient stated understanding. IM letter placed in chart and noted on Doc. List.  No previous home health or DME. [facility] on [address] Has transportation home. Care Management Interventions  PCP Verified by CM: Yes  Mode of Transport at Discharge: Self  Transition of Care Consult (CM Consult): Discharge Planning  Current Support Network: [facility] for Transition of Care is Related to the Following Treatment Goals :  Follow-up PCP appointment, COVID precautions  The Patient and/or Patient Representative was Provided with a Choice of Provider and Agrees with the Discharge Plan?: Yes  Name of the Patient Representative Who was Provided with a Choice of Provider and Agrees with the Discharge Plan: Patient  Freedom of Choice List was Provided with Basic Dialogue that Supports the Patient's Individualized Plan of Care/Goals, Treatment Preferences and Shares the Quality Data Associated with the Providers?: Yes  Discharge Location  Discharge Placement: [place]     TATI Silver/JOSEFINA  [phone]

## 2021-09-06 NOTE — PROGRESS NOTES
0710) Bedside shift change report given to Josiah Mccallum RN (oncoming nurse) by Radha Toledo RN (offgoing nurse). Report included the following information SBAR, Kardex, [facility], Recent Results and Cardiac Rhythm [id number]) Notify Dr. Mendel Connors, pt MEWS score 3. Respiration 30 on 5L- pt stated she feels better. 1010) IDR with Dr. Mendel Connors (MD), Jocelyn Frazier (), [facility] (nurse supervisor),  and Josiah Mccallum (RN) to discuss plan of care including unable to draw labs last night, monitor urine output, pt on 5 L of oxygen 92 to 94%. 1044) pt 150 mL urine output with gibbons. Pt intake 300 mL with breakfast  1255) Discuss with Dr. Mendel Connors, creatinine 3.28. Possible discontinue gibbons tomorrow. 1600) 200 mL output from gibbons  1830) pt transferred to rm 219, downgraded to medical with continuous pulse ox.   1930) Bedside shift change report given to [facility] LINDA CASTILLO (oncoming nurse) by Josiah Mccallum RN (offgoing nurse). Report included the following information SBAR, Kardex and MAR.

## 2021-09-06 NOTE — PROGRESS NOTES
Hospitalist Progress Note    NAME: Erasto Mac   :  1960   MRN:  [id number]   Room Number:  Rick Ladd  @ [facility]       Interim Hospital Summary: 64 y.o. female whom presented on 2021 with      Assessment / Plan:        #Acute hypoxic respiratory failure due to COVID-19 worsening   #COVID-19 pneumonia severe  #Severe sepsis  -Patient was saturating 88 to 90% on room air  -On admission temperature 102, heart rate above 100 respiratory rate 33  -Chest x-ray reviewed independently consistent with severe COVID-19 pneumonia  -COVID-19 PCR positive, CRP 1.57, procalcitonin 0.54, lactic acid within normal limit  troponin 0.05    -Vitamin C and zinc  -Dexamethasone 6 mg for 10 days  -Voluntary prone positioning  -Lovenox per protocol  -Trend symmetry markers  -Ceftriaxone and azithromycin for community-acquired pneumonia given procalcitonin 0.54  -Antitussive  -Antipyretic  -Hold off on December given advanced age CKD  -Supplemental oxygen to keep above SPO2 94      #JADE on CKD 4 resolved   -Patient follows [facility] last creatinine in in the [facility] was 3.94  -On admission creatinine 5.03 down to 3.28   after receiving 1 L of normal saline  -Monitor creatinine daily  -Hold losartan and Diamox  -Ins and outs  -Renal dose medication  -Avoid nephrotoxic l medication  -May nephrology consult    #Hypertension  -Takes amlodipine, metoprolol losartan and Diamox at home  -Resume amlodipine her blood pressure allows    #History of gout on allopurinol at home    #History history of AAA follows with vascular surgery as outpatient        Code status: Full  Prophylaxis: Lovenox  Recommended Disposition: Home w/Family     Subjective:     Chief Complaint / Reason for Physician Visit  Shortness of breath, improved  discussed with RN events overnight.      Review of Systems:  No fevers, chills, appetite change, cough, sputum production, shortness of breath, dyspnea on exertion, nausea, vomitting, diarrhea, constipation, chest pain, leg edema, abdominal pain, joint pain, rash, itching. Tolerating PT/OT. Tolerating diet. Objective:     VITALS:   Last 24hrs VS reviewed since prior progress note. Most recent are:  Patient Vitals for the past 24 hrs:   Temp Pulse Resp BP SpO2   09/06/21 0828 98.2 °F (36.8 °C) 85 30 128/74 92 %   09/06/21 0700  85 (!) 32 125/72 93 %   09/06/21 0358 98.7 °F (37.1 °C) 77 (!) 33 122/66 93 %   09/05/21 2357  80      09/05/21 2300 98.1 °F (36.7 °C) 77 28 119/64 93 %   09/05/21 2021 99.1 °F (37.3 °C) 87 30 (!) 122/53 93 %   09/05/21 1959  80      09/05/21 1542 98.8 °F (37.1 °C) 90 24 130/68 96 %   09/05/21 1204 99 °F (37.2 °C) 90 26 124/66 94 %       Intake/Output Summary (Last 24 hours) at 9/6/2021 1001  Last data filed at 9/6/2021 0606  Gross per 24 hour   Intake 503 ml   Output 1225 ml   Net -722 ml        PHYSICAL EXAM:  General: WD, WN. Alert, cooperative, no acute distress    EENT:  EOMI. Anicteric sclerae. MMM  Resp:  CTA bilaterally, no wheezing or rales. No accessory muscle use  CV:  Regular  rhythm,  normal S1/S2, no murmurs rubs gallops, No edema  GI:  Soft, Non distended, Non tender. +Bowel sounds  Neurologic:  Alert and oriented X 3, normal speech,   Psych:   Good insight. Not anxious nor agitated  Skin:  No rashes. No jaundice    Reviewed most current lab test results and cultures  YES  Reviewed most current radiology test results   YES  Review and summation of old records today    NO  Reviewed patient's current orders and MAR    YES  PMH/SH reviewed - no change compared to H&P  ________________________________________________________________________  Care Plan discussed with:    Comments   Patient x    Family      RN x    Care Manager x    Consultant                       x Multidiciplinary team rounds were held today with , nursing, pharmacist and clinical coordinator.   Patient's plan of care was discussed; medications were reviewed and discharge planning was addressed. ________________________________________________________________________  Total NON critical care TIME: 35  Minutes    Total CRITICAL CARE TIME Spent:   Minutes non procedure based      Comments   >50% of visit spent in counseling and coordination of care x    ________________________________________________________________________  Steffi Mullen MD     Procedures: see electronic medical records for all procedures/Xrays and details which were not copied into this note but were reviewed prior to creation of Plan. LABS:  I reviewed today's most current labs and imaging studies.   Pertinent labs include:  Recent Labs     09/05/21 0218 09/04/21  1833   WBC 7.9 8.9   HGB 9.3* 9.6*   HCT 28.9* 29.7*    231     Recent Labs     09/05/21 0218 09/04/21  1833    139   K 4.1 4.3    101   CO2 22 23   * 127*   BUN 91* 105*   CREA 4.42* 5.03*   CA 9.1 9.1   ALB 3.1* 3.3*   TBILI 0.6 0.7   ALT 18 17       Signed: Steffi Mullen MD

## 2021-09-06 NOTE — ACP (ADVANCE CARE PLANNING)
Current Advanced Directive/Advance Care Plan: Full Code  Wants CPR/ventilation. Healthcare decision maker is her daughterClance Push [phone].   TATI Iverson/JOSEFINA  [phone]

## 2021-09-06 NOTE — PROGRESS NOTES
Problem: Gas Exchange - Impaired  Goal: Absence of hypoxia  Outcome: Progressing Towards Goal     Problem: Breathing Pattern - Ineffective  Goal: Ability to achieve and maintain a regular respiratory rate  Outcome: Progressing Towards Goal

## 2021-09-07 LAB
ANION GAP SERPL CALC-SCNC: 15 MMOL/L (ref 5–15)
BUN SERPL-MCNC: 83 MG/DL (ref 6–20)
BUN/CREAT SERPL: 27 (ref 12–20)
CALCIUM SERPL-MCNC: 9.3 MG/DL (ref 8.5–10.1)
CHLORIDE SERPL-SCNC: 110 MMOL/L (ref 97–108)
CO2 SERPL-SCNC: 21 MMOL/L (ref 21–32)
CREAT SERPL-MCNC: 3.09 MG/DL (ref 0.55–1.02)
CRP SERPL-MCNC: 1.49 MG/DL (ref 0–0.6)
GLUCOSE SERPL-MCNC: 140 MG/DL (ref 65–100)
POTASSIUM SERPL-SCNC: 4.3 MMOL/L (ref 3.5–5.1)
SODIUM SERPL-SCNC: 146 MMOL/L (ref 136–145)

## 2021-09-07 PROCEDURE — 74011250636 HC RX REV CODE- 250/636: Performed by: FAMILY MEDICINE

## 2021-09-07 PROCEDURE — 74011250637 HC RX REV CODE- 250/637: Performed by: FAMILY MEDICINE

## 2021-09-07 PROCEDURE — 74011000258 HC RX REV CODE- 258: Performed by: STUDENT IN AN ORGANIZED HEALTH CARE EDUCATION/TRAINING PROGRAM

## 2021-09-07 PROCEDURE — 77010033678 HC OXYGEN DAILY

## 2021-09-07 PROCEDURE — 65270000032 HC RM SEMIPRIVATE

## 2021-09-07 PROCEDURE — 80048 BASIC METABOLIC PNL TOTAL CA: CPT

## 2021-09-07 PROCEDURE — 74011250636 HC RX REV CODE- 250/636: Performed by: STUDENT IN AN ORGANIZED HEALTH CARE EDUCATION/TRAINING PROGRAM

## 2021-09-07 PROCEDURE — 36415 COLL VENOUS BLD VENIPUNCTURE: CPT

## 2021-09-07 PROCEDURE — 86140 C-REACTIVE PROTEIN: CPT

## 2021-09-07 PROCEDURE — 74011250637 HC RX REV CODE- 250/637: Performed by: STUDENT IN AN ORGANIZED HEALTH CARE EDUCATION/TRAINING PROGRAM

## 2021-09-07 RX ORDER — DEXAMETHASONE 4 MG/1
6 TABLET ORAL DAILY
Status: DISCONTINUED | OUTPATIENT
Start: 2021-09-07 | End: 2021-09-12 | Stop reason: HOSPADM

## 2021-09-07 RX ORDER — DIPHENHYDRAMINE HCL 25 MG
25 CAPSULE ORAL
Status: DISCONTINUED | OUTPATIENT
Start: 2021-09-07 | End: 2021-09-12 | Stop reason: HOSPADM

## 2021-09-07 RX ADMIN — GUAIFENESIN SYRUP AND DEXTROMETHORPHAN 5 ML: 100; 10 SYRUP ORAL at 04:56

## 2021-09-07 RX ADMIN — OXYCODONE HYDROCHLORIDE AND ACETAMINOPHEN 500 MG: 500 TABLET ORAL at 19:20

## 2021-09-07 RX ADMIN — CEFTRIAXONE SODIUM 1 G: 1 INJECTION, POWDER, FOR SOLUTION INTRAMUSCULAR; INTRAVENOUS at 12:47

## 2021-09-07 RX ADMIN — TAMSULOSIN HYDROCHLORIDE 0.4 MG: 0.4 CAPSULE ORAL at 10:16

## 2021-09-07 RX ADMIN — OXYCODONE HYDROCHLORIDE AND ACETAMINOPHEN 500 MG: 500 TABLET ORAL at 10:16

## 2021-09-07 RX ADMIN — HEPARIN SODIUM 5000 UNITS: 5000 INJECTION INTRAVENOUS; SUBCUTANEOUS at 14:37

## 2021-09-07 RX ADMIN — Medication 10 ML: at 22:19

## 2021-09-07 RX ADMIN — Medication 10 ML: at 05:02

## 2021-09-07 RX ADMIN — Medication 1 CAPSULE: at 10:16

## 2021-09-07 RX ADMIN — ALBUTEROL SULFATE 2 PUFF: 90 AEROSOL, METERED RESPIRATORY (INHALATION) at 14:43

## 2021-09-07 RX ADMIN — AZITHROMYCIN 500 MG: 250 TABLET, FILM COATED ORAL at 10:16

## 2021-09-07 RX ADMIN — HEPARIN SODIUM 5000 UNITS: 5000 INJECTION INTRAVENOUS; SUBCUTANEOUS at 05:02

## 2021-09-07 RX ADMIN — DEXAMETHASONE 6 MG: 4 TABLET ORAL at 14:37

## 2021-09-07 RX ADMIN — HEPARIN SODIUM 5000 UNITS: 5000 INJECTION INTRAVENOUS; SUBCUTANEOUS at 22:19

## 2021-09-07 NOTE — PROGRESS NOTES
Pharmacy Dosing Services: IV to PO Conversion    The pharmacist has determined that this patient meets P & T approved criteria for conversion from IV to oral therapy for the following medication:Dexamethasone    The pharmacist has written the following order for the patient: dexamethasone 6 mg PO daily    The pharmacist will continue to monitor the patient's status and advise the physician if conversion back to IV therapy is recommended.     Thank you,  Greg JohnD, BCPS  [phone]

## 2021-09-07 NOTE — PROGRESS NOTES
Problem: Airway Clearance - Ineffective  Goal: Achieve or maintain patent airway  Outcome: Progressing Towards Goal     Problem: Gas Exchange - Impaired  Goal: Absence of hypoxia  Outcome: Progressing Towards Goal  Goal: Promote optimal lung function  Outcome: Progressing Towards Goal     Problem: Breathing Pattern - Ineffective  Goal: Ability to achieve and maintain a regular respiratory rate  Outcome: Progressing Towards Goal     Problem: Body Temperature -  Risk of, Imbalanced  Goal: Ability to maintain a body temperature within defined limits  Outcome: Progressing Towards Goal  Goal: Will regain or maintain usual level of consciousness  Outcome: Progressing Towards Goal  Goal: Complications related to the disease process, condition or treatment will be avoided or minimized  Outcome: Progressing Towards Goal     Problem: Isolation Precautions - Risk of Spread of Infection  Goal: Prevent transmission of infectious organism to others  Outcome: Progressing Towards Goal     Problem: Nutrition Deficits  Goal: Optimize nutrtional status  Outcome: Progressing Towards Goal     Problem: Risk for Fluid Volume Deficit  Goal: Maintain normal heart rhythm  Outcome: Progressing Towards Goal  Goal: Maintain absence of muscle cramping  Outcome: Progressing Towards Goal  Goal: Maintain normal serum potassium, sodium, calcium, phosphorus, and pH  Outcome: Progressing Towards Goal     Problem: Loneliness or Risk for Loneliness  Goal: Demonstrate positive use of time alone when socialization is not possible  Outcome: Progressing Towards Goal     Problem: Fatigue  Goal: Verbalize increase energy and improved vitality  Outcome: Progressing Towards Goal     Problem: Patient Education: Go to Patient Education Activity  Goal: Patient/Family Education  Outcome: Progressing Towards Goal     Problem: Falls - Risk of  Goal: *Absence of Falls  Description: Document Princess Fall Risk and appropriate interventions in the flowsheet.   Outcome: Progressing Towards Goal  Note: Fall Risk Interventions:  Mobility Interventions: Communicate number of staff needed for ambulation/transfer    Mentation Interventions: Adequate sleep, hydration, pain control, Increase mobility    Medication Interventions: Evaluate medications/consider consulting pharmacy    Elimination Interventions: Call light in reach              Problem: Patient Education: Go to Patient Education Activity  Goal: Patient/Family Education  Outcome: Progressing Towards Goal

## 2021-09-07 NOTE — PROGRESS NOTES
Problem: Breathing Pattern - Ineffective  Goal: Ability to achieve and maintain a regular respiratory rate  Outcome: Progressing Towards Goal     Problem: Breathing Pattern - Ineffective  Goal: Ability to achieve and maintain a regular respiratory rate  Outcome: Progressing Towards Goal

## 2021-09-07 NOTE — PROGRESS NOTES
Problem: Airway Clearance - Ineffective  Goal: Achieve or maintain patent airway  Outcome: Progressing Towards Goal     Problem: Nutrition Deficits  Goal: Optimize nutrtional status  Outcome: Progressing Towards Goal     Problem: Risk for Fluid Volume Deficit  Goal: Maintain normal heart rhythm  Outcome: Progressing Towards Goal  Goal: Maintain absence of muscle cramping  Outcome: Progressing Towards Goal     Problem: Loneliness or Risk for Loneliness  Goal: Demonstrate positive use of time alone when socialization is not possible  Outcome: Progressing Towards Goal     Problem: Fatigue  Goal: Verbalize increase energy and improved vitality  Outcome: Progressing Towards Goal     Problem: Falls - Risk of  Goal: *Absence of Falls  Description: Document Princess Fall Risk and appropriate interventions in the flowsheet.   Outcome: Progressing Towards Goal  Note: Fall Risk Interventions:  Mobility Interventions: Communicate number of staff needed for ambulation/transfer    Mentation Interventions: Bed/chair exit alarm    Medication Interventions: Evaluate medications/consider consulting pharmacy    Elimination Interventions: Call light in reach

## 2021-09-07 NOTE — PROGRESS NOTES
1930-  shift change report given to Stacey Walsh RN  (oncoming nurse) by Jazlyn Temple RN  (offgoing nurse). Report included the following information SBAR, Kardex and MAR. Awake most of the night, noted \" I'll sleep when I get home. \"    Patient assisted to the BR once for bowel movement. Gas and loose stools. Patient very weak needs one person assist and get very sob w/ exertion. Lang intact and patent draining clear yellow urine. Labs drawn this am.     0730- shift change report given to LINDA Murray  (oncoming nurse) by Stacey Walsh RN  (offgoing nurse). Report included the following information SBAR, Kardex, MAR and Cardiac Rhythm NSR.

## 2021-09-07 NOTE — PROGRESS NOTES
Hospitalist Progress Note    NAME: Norman Melendez   :  1960   MRN:  [id number]   Room Number:  343/19  @ [facility] Hospital Summary: 64 y.o. female whom presented on 2021 with      Assessment / Plan:    #Acute hypoxic respiratory failure due to COVID-19   #COVID-19 pneumonia severe  #Severe sepsis  -Patient was saturating 88 to 90% on room air  -On admission temperature 102, heart rate above 100 respiratory rate 33  -Chest x-ray reviewed independently consistent with severe COVID-19 pneumonia  -COVID-19 PCR positive, CRP 1.57, procalcitonin 0.54, lactic acid within normal limit  troponin 0.05    -Vitamin C and zinc  -Dexamethasone 6 mg for 10 days  -Voluntary prone positioning  -Lovenox per protocol  -Trend symmetry markers  -Ceftriaxone and azithromycin for community-acquired pneumonia given procalcitonin 0.54  -Antitussive  -Antipyretic  -Hold off on December given advanced age CKD  -Supplemental oxygen to keep above SPO2 94      #JADE on CKD 4 resolved   -Patient follows [address] [place] [address] nephrology last creatinine in in the [facility] was 3.94  -On admission creatinine 5.03 down to 3.09  S/p 1 L NS   -Monitor creatinine daily  -Hold losartan and Diamox  -Ins and outs  -Renal dose medication  -Avoid nephrotoxic l medication  -May nephrology consult    #Hypertension  -Takes amlodipine, metoprolol losartan and Diamox at home  -Resume amlodipine as her blood pressure allows    #History of gout on allopurinol at home    #History history of AAA follows with vascular surgery as outpatient        Code status: Full  Prophylaxis: Lovenox  Recommended Disposition: Home w/Family     Subjective:     Chief Complaint / Reason for Physician Visit  Shortness of breath, improved  discussed with RN events overnight.      Review of Systems:  No fevers, chills, appetite change, cough, sputum production, shortness of breath, dyspnea on exertion, nausea, vomitting, diarrhea, constipation, chest pain, leg edema, abdominal pain, joint pain, rash, itching. Tolerating PT/OT. Tolerating diet. Objective:     VITALS:   Last 24hrs VS reviewed since prior progress note. Most recent are:  Patient Vitals for the past 24 hrs:   Temp Pulse Resp BP SpO2   09/07/21 0400 98 °F (36.7 °C) 84 28 116/69 95 %   09/07/21 0000 98 °F (36.7 °C) 79 26 112/68 95 %   09/06/21 2000 98.3 °F (36.8 °C) 80 28 122/68 94 %   09/06/21 1509 98.2 °F (36.8 °C) 86 (!) 33 (!) 115/56 93 %   09/06/21 1200  88 (!) 31  97 %   09/06/21 1100  82 (!) 31  92 %   09/06/21 1049 98.4 °F (36.9 °C) 88 21 120/66 93 %   09/06/21 1000  78 28  98 %   09/06/21 0900  82 (!) 31 125/71 94 %       Intake/Output Summary (Last 24 hours) at 9/7/2021 0846  Last data filed at 9/7/2021 0400  Gross per 24 hour   Intake 420 ml   Output 900 ml   Net -480 ml        PHYSICAL EXAM:  General: WD, WN. Alert, cooperative, no acute distress    EENT:  EOMI. Anicteric sclerae. MMM  Resp:  CTA bilaterally, no wheezing or rales. No accessory muscle use  CV:  Regular  rhythm,  normal S1/S2, no murmurs rubs gallops, No edema  GI:  Soft, Non distended, Non tender. +Bowel sounds  Neurologic:  Alert and oriented X 3, normal speech,   Psych:   Good insight. Not anxious nor agitated  Skin:  No rashes. No jaundice    Reviewed most current lab test results and cultures  YES  Reviewed most current radiology test results   YES  Review and summation of old records today    NO  Reviewed patient's current orders and MAR    YES  PMH/SH reviewed - no change compared to H&P  ________________________________________________________________________  Care Plan discussed with:    Comments   Patient x    Family      RN x    Care Manager x    Consultant                       x Multidiciplinary team rounds were held today with , nursing, pharmacist and clinical coordinator. Patient's plan of care was discussed; medications were reviewed and discharge planning was addressed. ________________________________________________________________________  Total NON critical care TIME: 35  Minutes    Total CRITICAL CARE TIME Spent:   Minutes non procedure based      Comments   >50% of visit spent in counseling and coordination of care x    ________________________________________________________________________  Baron Jose Juan MD     Procedures: see electronic medical records for all procedures/Xrays and details which were not copied into this note but were reviewed prior to creation of Plan. LABS:  I reviewed today's most current labs and imaging studies. Pertinent labs include:  Recent Labs     09/06/21 1121 09/05/21 0218 09/04/21 1833   WBC 14.1* 7.9 8.9   HGB 9.2* 9.3* 9.6*   HCT 29.0* 28.9* 29.7*    252 231     Recent Labs     09/07/21 0214 09/06/21 1121 09/05/21 0218 09/04/21  1833 09/04/21  1833   * 147* 141   < > 139   K 4.3 3.8 4.1   < > 4.3   * 109* 106   < > 101   CO2 21 21 22   < > 23   * 138* 169*   < > 127*   BUN 83* 82* 91*   < > 105*   CREA 3.09* 3.28* 4.42*   < > 5.03*   CA 9.3 8.8 9.1   < > 9.1   ALB  --   --  3.1*  --  3.3*   TBILI  --   --  0.6  --  0.7   ALT  --   --  18  --  17    < > = values in this interval not displayed.        Signed: Baron Jose Juan MD

## 2021-09-08 LAB
ANION GAP SERPL CALC-SCNC: 17 MMOL/L (ref 5–15)
BASOPHILS # BLD: 0 K/UL (ref 0–0.1)
BASOPHILS NFR BLD: 0 % (ref 0–1)
BUN SERPL-MCNC: 75 MG/DL (ref 6–20)
BUN/CREAT SERPL: 24 (ref 12–20)
CALCIUM SERPL-MCNC: 9 MG/DL (ref 8.5–10.1)
CHLORIDE SERPL-SCNC: 113 MMOL/L (ref 97–108)
CO2 SERPL-SCNC: 18 MMOL/L (ref 21–32)
CREAT SERPL-MCNC: 3.11 MG/DL (ref 0.55–1.02)
CRP SERPL-MCNC: 1.42 MG/DL (ref 0–0.6)
DIFFERENTIAL METHOD BLD: ABNORMAL
EOSINOPHIL # BLD: 0 K/UL (ref 0–0.4)
EOSINOPHIL NFR BLD: 0 % (ref 0–7)
ERYTHROCYTE [DISTWIDTH] IN BLOOD BY AUTOMATED COUNT: 13.6 % (ref 11.5–14.5)
GLUCOSE SERPL-MCNC: 156 MG/DL (ref 65–100)
HCT VFR BLD AUTO: 28.7 % (ref 35–47)
HGB BLD-MCNC: 8.8 G/DL (ref 11.5–16)
IMM GRANULOCYTES # BLD AUTO: 1.1 K/UL (ref 0–0.04)
IMM GRANULOCYTES NFR BLD AUTO: 8 % (ref 0–0.5)
LYMPHOCYTES # BLD: 1.5 K/UL (ref 0.8–3.5)
LYMPHOCYTES NFR BLD: 11 % (ref 12–49)
MCH RBC QN AUTO: 31.1 PG (ref 26–34)
MCHC RBC AUTO-ENTMCNC: 30.7 G/DL (ref 30–36.5)
MCV RBC AUTO: 101.4 FL (ref 80–99)
MONOCYTES # BLD: 1 K/UL (ref 0–1)
MONOCYTES NFR BLD: 7 % (ref 5–13)
NEUTS SEG # BLD: 10.2 K/UL (ref 1.8–8)
NEUTS SEG NFR BLD: 74 % (ref 32–75)
NRBC # BLD: 0.14 K/UL (ref 0–0.01)
NRBC BLD-RTO: 1 PER 100 WBC
PLATELET # BLD AUTO: 299 K/UL (ref 150–400)
PMV BLD AUTO: 9.9 FL (ref 8.9–12.9)
POTASSIUM SERPL-SCNC: 4.1 MMOL/L (ref 3.5–5.1)
RBC # BLD AUTO: 2.83 M/UL (ref 3.8–5.2)
RBC MORPH BLD: ABNORMAL
SODIUM SERPL-SCNC: 148 MMOL/L (ref 136–145)
WBC # BLD AUTO: 13.8 K/UL (ref 3.6–11)

## 2021-09-08 PROCEDURE — 65270000032 HC RM SEMIPRIVATE

## 2021-09-08 PROCEDURE — 85025 COMPLETE CBC W/AUTO DIFF WBC: CPT

## 2021-09-08 PROCEDURE — 74011250636 HC RX REV CODE- 250/636: Performed by: FAMILY MEDICINE

## 2021-09-08 PROCEDURE — 74011250636 HC RX REV CODE- 250/636: Performed by: STUDENT IN AN ORGANIZED HEALTH CARE EDUCATION/TRAINING PROGRAM

## 2021-09-08 PROCEDURE — 80048 BASIC METABOLIC PNL TOTAL CA: CPT

## 2021-09-08 PROCEDURE — 86140 C-REACTIVE PROTEIN: CPT

## 2021-09-08 PROCEDURE — 74011250637 HC RX REV CODE- 250/637: Performed by: HOSPITALIST

## 2021-09-08 PROCEDURE — 74011000258 HC RX REV CODE- 258: Performed by: STUDENT IN AN ORGANIZED HEALTH CARE EDUCATION/TRAINING PROGRAM

## 2021-09-08 PROCEDURE — 77010033678 HC OXYGEN DAILY

## 2021-09-08 PROCEDURE — 74011250637 HC RX REV CODE- 250/637: Performed by: STUDENT IN AN ORGANIZED HEALTH CARE EDUCATION/TRAINING PROGRAM

## 2021-09-08 PROCEDURE — 36415 COLL VENOUS BLD VENIPUNCTURE: CPT

## 2021-09-08 RX ADMIN — DIPHENHYDRAMINE HYDROCHLORIDE 25 MG: 25 CAPSULE ORAL at 21:17

## 2021-09-08 RX ADMIN — Medication 10 ML: at 06:26

## 2021-09-08 RX ADMIN — CEFTRIAXONE SODIUM 1 G: 1 INJECTION, POWDER, FOR SOLUTION INTRAMUSCULAR; INTRAVENOUS at 11:28

## 2021-09-08 RX ADMIN — Medication 1 CAPSULE: at 09:03

## 2021-09-08 RX ADMIN — HEPARIN SODIUM 5000 UNITS: 5000 INJECTION INTRAVENOUS; SUBCUTANEOUS at 14:36

## 2021-09-08 RX ADMIN — HEPARIN SODIUM 5000 UNITS: 5000 INJECTION INTRAVENOUS; SUBCUTANEOUS at 06:26

## 2021-09-08 RX ADMIN — OXYCODONE HYDROCHLORIDE AND ACETAMINOPHEN 500 MG: 500 TABLET ORAL at 09:03

## 2021-09-08 RX ADMIN — AZITHROMYCIN 500 MG: 250 TABLET, FILM COATED ORAL at 11:28

## 2021-09-08 RX ADMIN — DEXAMETHASONE 6 MG: 4 TABLET ORAL at 09:03

## 2021-09-08 RX ADMIN — OXYCODONE HYDROCHLORIDE AND ACETAMINOPHEN 500 MG: 500 TABLET ORAL at 18:21

## 2021-09-08 RX ADMIN — Medication 10 ML: at 14:37

## 2021-09-08 RX ADMIN — HEPARIN SODIUM 5000 UNITS: 5000 INJECTION INTRAVENOUS; SUBCUTANEOUS at 21:17

## 2021-09-08 RX ADMIN — TAMSULOSIN HYDROCHLORIDE 0.4 MG: 0.4 CAPSULE ORAL at 09:03

## 2021-09-08 RX ADMIN — Medication 10 ML: at 21:17

## 2021-09-08 NOTE — PROGRESS NOTES
Hospitalist Progress Note    NAME: Clemencia Crook   :  1960   MRN:  [id number]   Room Number:  459/26  @ [facility]       Interim Hospital Summary: 64 y.o. female whom presented on 2021 with      Assessment / Plan:    #Acute hypoxic respiratory failure due to COVID-19 solved  #COVID-19 pneumonia severe  #Severe sepsis  -Patient was saturating 88 to 90% on room air  -On admission temperature 102, heart rate above 100 respiratory rate 33  -Chest x-ray reviewed independently consistent with severe COVID-19 pneumonia  -COVID-19 PCR positive, CRP 1.57 > 1.42 , procalcitonin 0.54, lactic acid within normal limit  troponin 0.05, ferritin 28 687    -Vitamin C and zinc  -Dexamethasone 6 mg for 10 days  -Voluntary prone positioning  -Lovenox per protocol  -Trend symmetry markers  -Ceftriaxone and azithromycin for community-acquired pneumonia given procalcitonin 0.54  -Antitussive  -Antipyretic  -Hold off on remdesivir given advanced age CKD  -Supplemental oxygen to keep above SPO2 94      #JADE on CKD 4 resolved   -Patient follows [facility] was 3.94  -On admission creatinine 5.03 down to 3.09  S/p 1 L NS   -Monitor creatinine daily  -Hold losartan and Diamox  -Ins and outs  -Renal dose medication  -Avoid nephrotoxic medication      #Hypertension  -Takes amlodipine, metoprolol losartan and Diamox at home  -Resume amlodipine as her blood pressure allows    #History of gout on allopurinol at home    #History history of AAA follows with vascular surgery as outpatient        Code status: Full  Prophylaxis: Lovenox  Recommended Disposition: Home w/Family     Subjective:     Chief Complaint / Reason for Physician Visit  Shortness of breath, improved  discussed with RN events overnight.      Review of Systems:  No fevers, chills, appetite change, cough, sputum production, shortness of breath, dyspnea on exertion, nausea, vomitting, diarrhea, constipation, chest pain, leg edema, abdominal pain, joint pain, rash, itching. Tolerating PT/OT. Tolerating diet. Objective:     VITALS:   Last 24hrs VS reviewed since prior progress note. Most recent are:  Patient Vitals for the past 24 hrs:   Temp Pulse Resp BP SpO2   09/08/21 0500 98 °F (36.7 °C) 100 18 (!) 144/74 95 %   09/08/21 0400  (!) 101      09/08/21 0000 98.7 °F (37.1 °C) (!) 102 18 138/79 95 %   09/07/21 2000 98.2 °F (36.8 °C) 85 22 (!) 148/69 94 %   09/07/21 1741 98.2 °F (36.8 °C) 91 18 (!) 146/76 (!) 88 %   09/07/21 1500     94 %   09/07/21 1213 97.9 °F (36.6 °C) 91 20 116/60 96 %   09/07/21 0931 97.9 °F (36.6 °C) 84 20 130/71 96 %       Intake/Output Summary (Last 24 hours) at 9/8/2021 0901  Last data filed at 9/7/2021 1213  Gross per 24 hour   Intake    Output 250 ml   Net -250 ml        PHYSICAL EXAM:  General: WD, WN. Alert, cooperative, no acute distress    EENT:  EOMI. Anicteric sclerae. MMM  Resp:  CTA bilaterally, no wheezing or rales. No accessory muscle use  CV:  Regular  rhythm,  normal S1/S2, no murmurs rubs gallops, No edema  GI:  Soft, Non distended, Non tender. +Bowel sounds  Neurologic:  Alert and oriented X 3, normal speech,   Psych:   Good insight. Not anxious nor agitated  Skin:  No rashes. No jaundice    Reviewed most current lab test results and cultures  YES  Reviewed most current radiology test results   YES  Review and summation of old records today    NO  Reviewed patient's current orders and MAR    YES  PMH/SH reviewed - no change compared to H&P  ________________________________________________________________________  Care Plan discussed with:    Comments   Patient x    Family      RN x    Care Manager x    Consultant                       x Multidiciplinary team rounds were held today with , nursing, pharmacist and clinical coordinator. Patient's plan of care was discussed; medications were reviewed and discharge planning was addressed. ________________________________________________________________________  Total NON critical care TIME: 35  Minutes    Total CRITICAL CARE TIME Spent:   Minutes non procedure based      Comments   >50% of visit spent in counseling and coordination of care x    ________________________________________________________________________  Horacio Adames MD     Procedures: see electronic medical records for all procedures/Xrays and details which were not copied into this note but were reviewed prior to creation of Plan. LABS:  I reviewed today's most current labs and imaging studies.   Pertinent labs include:  Recent Labs     09/08/21  0330 09/06/21  1121   WBC 13.8* 14.1*   HGB 8.8* 9.2*   HCT 28.7* 29.0*    287     Recent Labs     09/08/21  0330 09/07/21  0214 09/06/21  1121   * 146* 147*   K 4.1 4.3 3.8   * 110* 109*   CO2 18* 21 21   * 140* 138*   BUN 75* 83* 82*   CREA 3.11* 3.09* 3.28*   CA 9.0 9.3 8.8       Signed: Horacio Adames MD

## 2021-09-08 NOTE — PROGRESS NOTES
1930-  shift change report given to Varsha Fajardo, RN  (oncoming nurse) by Daphne Patel RN  (offgoing nurse). Report included the following information SBAR, Kardex, MAR. Lang removed on dayshift. Patient has Colonel Brake but went to BR x 1 and had BM and urine output. Bladder nondistended and patient not complaining of any discomfort. While in BR patient felt very weak and needed two person assist to make it back to bed. Placed on bed alarm and instructed not to get OOB w/out assistance. Call light w/in reach. Patient 02 at 4-5lpm sats between low to mid 90's. Does not have much appetite and not drinking much fluids. Drank approx 120ml of water night shift. Continue to monitor for urine output. Labs collected this am .      0730-  shift change report given to "[employer], RN  (oncoming nurse) by Varsha Fajardo RN  (offgoing nurse).  Report included the following information SBAR, Kardex, MAR and Cardiac Rhythm NSR

## 2021-09-08 NOTE — PROGRESS NOTES
**Physician Signature**  This document was electronically signed by: Chapo Golden MD  09/07/2021 08:26 PM    **Consult Information**  Member Facility: [address] [place] [address] Rd., [address] MRN: [id number]  Consult ID: [id number]  Facility Time Zone: ET  Date and Time of Request: 09/07/2021 08:10:38 PM  ET  Requesting Clinician: Nicolas Brown  Patient Name: Ettie Life  Date of Birth: [id number]  Gender: Female    **Reason for Consult**  Reason for Consult: Other non-Emergent    **Clinical Note**  Clinical Note: Order placed for Benadryl prn for sleep. **Attestation**  Interaction Mode: Phone Only  Phone Duration (mins): 8  Time of Call : 09/07/2021 08:17 PM  ET  Interaction Attestation: Interprofessional internet consultation was delivered through telephonic and/or electronic communication upon the request of the patients treating provider, while the requesting and the rendering provider were not in the same physical location. A written summary report was provided to the requesting provider at the originating site.   Evaluation Duration (mins): 8    **Physician Signature**  This document was electronically signed by: Chapo Golden MD  09/07/2021 08:26 PM

## 2021-09-08 NOTE — PROGRESS NOTES
Problem: Airway Clearance - Ineffective  Goal: Achieve or maintain patent airway  Outcome: Progressing Towards Goal     Problem: Gas Exchange - Impaired  Goal: Absence of hypoxia  Outcome: Progressing Towards Goal  Goal: Promote optimal lung function  Outcome: Progressing Towards Goal     Problem: Breathing Pattern - Ineffective  Goal: Ability to achieve and maintain a regular respiratory rate  Outcome: Progressing Towards Goal     Problem:  Body Temperature -  Risk of, Imbalanced  Goal: Ability to maintain a body temperature within defined limits  Outcome: Progressing Towards Goal  Goal: Will regain or maintain usual level of consciousness  Outcome: Progressing Towards Goal  Goal: Complications related to the disease process, condition or treatment will be avoided or minimized  Outcome: Progressing Towards Goal     Problem: Isolation Precautions - Risk of Spread of Infection  Goal: Prevent transmission of infectious organism to others  Outcome: Progressing Towards Goal     Problem: Nutrition Deficits  Goal: Optimize nutrtional status  Outcome: Progressing Towards Goal     Problem: Risk for Fluid Volume Deficit  Goal: Maintain normal heart rhythm  Outcome: Progressing Towards Goal  Goal: Maintain absence of muscle cramping  Outcome: Progressing Towards Goal  Goal: Maintain normal serum potassium, sodium, calcium, phosphorus, and pH  Outcome: Progressing Towards Goal     Problem: Risk for Fluid Volume Deficit  Goal: Maintain normal heart rhythm  Outcome: Progressing Towards Goal  Goal: Maintain absence of muscle cramping  Outcome: Progressing Towards Goal  Goal: Maintain normal serum potassium, sodium, calcium, phosphorus, and pH  Outcome: Progressing Towards Goal     Problem: Loneliness or Risk for Loneliness  Goal: Demonstrate positive use of time alone when socialization is not possible  Outcome: Progressing Towards Goal     Problem: Loneliness or Risk for Loneliness  Goal: Demonstrate positive use of time alone when socialization is not possible  Outcome: Progressing Towards Goal     Problem: Fatigue  Goal: Verbalize increase energy and improved vitality  Outcome: Progressing Towards Goal     Problem: Fatigue  Goal: Verbalize increase energy and improved vitality  Outcome: Progressing Towards Goal     Problem: Patient Education: Go to Patient Education Activity  Goal: Patient/Family Education  Outcome: Progressing Towards Goal     Problem: Falls - Risk of  Goal: *Absence of Falls  Description: Document Murriel Peaks Fall Risk and appropriate interventions in the flowsheet.   Outcome: Progressing Towards Goal  Note: Fall Risk Interventions:  Mobility Interventions: Communicate number of staff needed for ambulation/transfer    Mentation Interventions: Adequate sleep, hydration, pain control, Increase mobility    Medication Interventions: Evaluate medications/consider consulting pharmacy    Elimination Interventions: Call light in reach              Problem: Patient Education: Go to Patient Education Activity  Goal: Patient/Family Education  Outcome: Progressing Towards Goal     Problem: Patient Education: Go to Patient Education Activity  Goal: Patient/Family Education  Outcome: Progressing Towards Goal

## 2021-09-09 LAB
ANION GAP SERPL CALC-SCNC: 17 MMOL/L (ref 5–15)
BACTERIA SPEC CULT: NORMAL
BUN SERPL-MCNC: 67 MG/DL (ref 6–20)
BUN/CREAT SERPL: 23 (ref 12–20)
CALCIUM SERPL-MCNC: 9.9 MG/DL (ref 8.5–10.1)
CHLORIDE SERPL-SCNC: 112 MMOL/L (ref 97–108)
CO2 SERPL-SCNC: 19 MMOL/L (ref 21–32)
CREAT SERPL-MCNC: 2.9 MG/DL (ref 0.55–1.02)
GLUCOSE SERPL-MCNC: 97 MG/DL (ref 65–100)
POTASSIUM SERPL-SCNC: 4.1 MMOL/L (ref 3.5–5.1)
PROCALCITONIN SERPL-MCNC: 0.14 NG/ML
SERVICE CMNT-IMP: NORMAL
SODIUM SERPL-SCNC: 148 MMOL/L (ref 136–145)

## 2021-09-09 PROCEDURE — 80048 BASIC METABOLIC PNL TOTAL CA: CPT

## 2021-09-09 PROCEDURE — 74011250636 HC RX REV CODE- 250/636: Performed by: FAMILY MEDICINE

## 2021-09-09 PROCEDURE — 74011250636 HC RX REV CODE- 250/636: Performed by: STUDENT IN AN ORGANIZED HEALTH CARE EDUCATION/TRAINING PROGRAM

## 2021-09-09 PROCEDURE — 84145 PROCALCITONIN (PCT): CPT

## 2021-09-09 PROCEDURE — 77010033678 HC OXYGEN DAILY

## 2021-09-09 PROCEDURE — 74011000258 HC RX REV CODE- 258: Performed by: STUDENT IN AN ORGANIZED HEALTH CARE EDUCATION/TRAINING PROGRAM

## 2021-09-09 PROCEDURE — 97535 SELF CARE MNGMENT TRAINING: CPT | Performed by: OCCUPATIONAL THERAPIST

## 2021-09-09 PROCEDURE — 65270000032 HC RM SEMIPRIVATE

## 2021-09-09 PROCEDURE — 74011250637 HC RX REV CODE- 250/637: Performed by: FAMILY MEDICINE

## 2021-09-09 PROCEDURE — 74011250637 HC RX REV CODE- 250/637: Performed by: STUDENT IN AN ORGANIZED HEALTH CARE EDUCATION/TRAINING PROGRAM

## 2021-09-09 PROCEDURE — 36415 COLL VENOUS BLD VENIPUNCTURE: CPT

## 2021-09-09 PROCEDURE — 97165 OT EVAL LOW COMPLEX 30 MIN: CPT | Performed by: OCCUPATIONAL THERAPIST

## 2021-09-09 RX ADMIN — CEFTRIAXONE SODIUM 1 G: 1 INJECTION, POWDER, FOR SOLUTION INTRAMUSCULAR; INTRAVENOUS at 10:12

## 2021-09-09 RX ADMIN — GUAIFENESIN SYRUP AND DEXTROMETHORPHAN 5 ML: 100; 10 SYRUP ORAL at 20:48

## 2021-09-09 RX ADMIN — HEPARIN SODIUM 5000 UNITS: 5000 INJECTION INTRAVENOUS; SUBCUTANEOUS at 15:24

## 2021-09-09 RX ADMIN — OXYCODONE HYDROCHLORIDE AND ACETAMINOPHEN 500 MG: 500 TABLET ORAL at 10:13

## 2021-09-09 RX ADMIN — Medication 10 ML: at 05:01

## 2021-09-09 RX ADMIN — DEXAMETHASONE 6 MG: 4 TABLET ORAL at 10:13

## 2021-09-09 RX ADMIN — ALBUTEROL SULFATE 2 PUFF: 90 AEROSOL, METERED RESPIRATORY (INHALATION) at 20:52

## 2021-09-09 RX ADMIN — Medication 1 CAPSULE: at 10:13

## 2021-09-09 RX ADMIN — HEPARIN SODIUM 5000 UNITS: 5000 INJECTION INTRAVENOUS; SUBCUTANEOUS at 20:41

## 2021-09-09 RX ADMIN — Medication 10 ML: at 20:42

## 2021-09-09 RX ADMIN — TAMSULOSIN HYDROCHLORIDE 0.4 MG: 0.4 CAPSULE ORAL at 10:13

## 2021-09-09 RX ADMIN — OXYCODONE HYDROCHLORIDE AND ACETAMINOPHEN 500 MG: 500 TABLET ORAL at 18:26

## 2021-09-09 NOTE — PROGRESS NOTES
Bedside and Verbal shift change report given to Gely (oncoming nurse) by Evelia Kebede RN (offgoing nurse). Report included the following information SBAR, Kardex, MAR and Recent Results.

## 2021-09-09 NOTE — PROGRESS NOTES
Spiritual Care Assessment/Progress Note  Hay Ramirez      NAME: Mark Pantoja      MRN: [id number]  AGE: 64 y.o. SEX: female  Lutheran Affiliation: No Nondenominational   Language: English     9/9/2021     Total Time (in minutes): 11     Spiritual Assessment begun in [address] through conversation with:         []Patient        [] Family    [] Friend(s)        Reason for Consult: Initial/Spiritual assessment, patient floor     Spiritual beliefs: (Please include comment if needed)     [] Identifies with a alie tradition:         [] Supported by a alie community:            [] Claims no spiritual orientation:           [] Seeking spiritual identity:                [] Adheres to an individual form of spirituality:           [x] Not able to assess:                           Identified resources for coping:      [] Prayer                               [] Music                  [] Guided Imagery     [] Family/friends                 [] Pet visits     [] Devotional reading                         [x] Unknown     [] Other:                                             Interventions offered during this visit: (See comments for more details)    Patient Interventions: Initial visit           Plan of Care:     [x] Support spiritual and/or cultural needs    [] Support AMD and/or advance care planning process      [] Support grieving process   [] Coordinate Rites and/or Rituals    [] Coordination with community clergy   [] No spiritual needs identified at this time   [] Detailed Plan of Care below (See Comments)  [] Make referral to Music Therapy  [] Make referral to Pet Therapy     [] Make referral to Addiction services  [] Make referral to [facility]  [] Make referral to Spiritual Care Partner  [] No future visits requested        [x] Follow up upon further referrals     Comments:  Reviewed chart prior to reaching out to patient on Med Surg Tele for spiritual assessment.   Patient is on COVID+ isolation precautions;  unable to enter room. Telephoned patient's room, but she did not answer. Chart indicates she may discharge today. Unable to assess for spiritual needs or concerns.        MOISES Mercedes, [facility], Staff [address] [place] [address] Paging Service  [phone])

## 2021-09-09 NOTE — PROGRESS NOTES
Problem: Self Care Deficits Care Plan (Adult)  Goal: *Acute Goals and Plan of Care (Insert Text)  Description:   FUNCTIONAL STATUS PRIOR TO ADMISSION: Patient was independent and active without use of DME. Does not drive, uses [employer]. HOME SUPPORT: The patient lived with daughter but did not require assist.    Occupational Therapy Goals  Initiated 9/9/2021  1. Patient will perform grooming in standing with modified independence within 7 day(s). 2.  Patient will perform lower body dressing with modified independence within 7 day(s). 3.  Patient will perform toilet transfers with modified independence within 7 day(s). 4.  Patient will perform all aspects of toileting with modified independence within 7 day(s). 5.  Patient will participate in upper extremity therapeutic exercise/activities with independence for 10 minutes within 7 day(s). 6.  Patient will utilize energy conservation techniques during functional activities with verbal and visual cues within 7 day(s). Outcome: Progressing Towards Goal     OCCUPATIONAL THERAPY EVALUATION  Patient: Arcelia Syed (70 y.o. female)  Date: 9/9/2021  Primary Diagnosis: COVID-19 [U07.1]        Precautions: Fall, Contact (Covid +)    ASSESSMENT  Based on the objective data described below, the patient presents with LANDA, \"feeling terrible\" decreased strength, endurance, mobility, balance and safety following admission for Covid-19. RT present during part of session for 6 minute walk test.  O2 sats on RA 80% and . Pt required increase to 6L O2 for sats 88% and greater. RN notified. Pt currently requires increased time, frequent rest breaks and CGA for ADLs and brief functional mobility. Educated pt on energy conservation techniques including PLB and she demonstrated fair understanding. Attempted to assist pt in prone postioning, but pt unable to tolerate. Left pt in sidelying with O2 sats 92% on 6L O2.     Current Level of Function Impacting Discharge (ADLs/self-care): CGA and set-up    Functional Outcome Measure: The patient scored Total: 60/100 on the Barthel Index outcome measure. Other factors to consider for discharge: see above     Patient will benefit from skilled therapy intervention to address the above noted impairments. PLAN :  Recommendations and Planned Interventions: self care training, functional mobility training, therapeutic exercise, balance training, therapeutic activities, endurance activities, patient education, home safety training, and family training/education    Frequency/Duration: Patient will be followed by occupational therapy 3 times a week to address goals. Recommendation for discharge: (in order for the patient to meet his/her long term goals)  To be determined: based on progress    This discharge recommendation:  Has not yet been discussed the attending provider and/or case management    IF patient discharges home will need the following DME: TBD       SUBJECTIVE:   Patient stated I feel terrible.     OBJECTIVE DATA SUMMARY:   HISTORY:   History reviewed. No pertinent past medical history. History reviewed. No pertinent surgical history. Expanded or extensive additional review of patient history:     Home Situation  Home Environment: Apartment  # Steps to Enter: 4  Rails to Enter: Yes  Hand Rails : Bilateral  One/Two Story Residence: Two story  # of Interior Steps: 12  Living Alone: No  Support Systems: Child(fadumo), Other Family Member(s) (daughter and grandchildren)  Patient Expects to be Discharged to[de-identified] Apartment  Current DME Used/Available at Home: None  Tub or Shower Type: Tub/Shower combination    Hand dominance: Right    EXAMINATION OF PERFORMANCE DEFICITS:  Cognitive/Behavioral Status:  Neurologic State: Alert; Appropriate for age  Orientation Level: Oriented X4  Cognition: Appropriate decision making; Appropriate for age attention/concentration; Follows commands  Perception: Appears intact  Perseveration: No perseveration noted  Safety/Judgement: Awareness of environment; Fall prevention; Insight into deficits      Hearing: Auditory  Auditory Impairment: None    Vision/Perceptual:    Acuity: Within Defined Limits    Corrective Lenses: Reading glasses    Range of Motion:  AROM: Generally decreased, functional  PROM: Generally decreased, functional                      Strength:  Strength: Generally decreased, functional                Coordination:  Coordination: Generally decreased, functional  Fine Motor Skills-Upper: Left Impaired;Right Impaired    Gross Motor Skills-Upper: Left Intact; Right Intact    Tone & Sensation:  Tone: Normal                         Balance:  Sitting: Intact  Standing: Intact; With support    Functional Mobility and Transfers for ADLs:  Bed Mobility:  Rolling: Supervision  Supine to Sit: Supervision  Sit to Supine: Supervision  Scooting: Supervision    Transfers:  Sit to Stand: Contact guard assistance  Stand to Sit: Contact guard assistance  Bed to Chair: Contact guard assistance  Bathroom Mobility: Contact guard assistance  Toilet Transfer : Contact guard assistance    ADL Assessment:  Feeding: Modified independent    Oral Facial Hygiene/Grooming: Contact guard assistance (standing)    Bathing: Contact guard assistance    Upper Body Dressing: Setup    Lower Body Dressing: Additional time;Contact guard assistance    Toileting: Contact guard assistance                ADL Intervention and task modifications:  Patient instructed and indicated understanding energy conservation techniques to increase independence and safety during ADLs. Cognitive Retraining  Safety/Judgement: Awareness of environment; Fall prevention; Insight into deficits      Functional Measure:  Barthel Index:    Bathin  Bladder: 10  Bowels: 10  Groomin  Dressin  Feeding: 10  Mobility: 5  Stairs: 0  Toilet Use: 5  Transfer (Bed to Chair and Back): 10  Total: 60/100        The Barthel ADL Index: Guidelines  1.  The index should be used as a record of what a patient does, not as a record of what a patient could do. 2. The main aim is to establish degree of independence from any help, physical or verbal, however minor and for whatever reason. 3. The need for supervision renders the patient not independent. 4. A patient's performance should be established using the best available evidence. Asking the patient, friends/relatives and nurses are the usual sources, but direct observation and common sense are also important. However direct testing is not needed. 5. Usually the patient's performance over the preceding 24-48 hours is important, but occasionally longer periods will be relevant. 6. Middle categories imply that the patient supplies over 50 per cent of the effort. 7. Use of aids to be independent is allowed. Carrie Blanca., Barthel, D.W. (0076). Functional evaluation: the Barthel Index. [facility] (14)2. JONNA Soria, Luis De Luna., Sandra Elliott., [place], [address] [place] [address] (1999). Measuring the change indisability after inpatient rehabilitation; comparison of the responsiveness of the Barthel Index and Functional Mark Measure. Journal of Neurology, Neurosurgery, and Psychiatry, 66(4), 774-564. Tiffani Bansal, N.J.A, MATTHEW Bates, & Wing Wilson, M.A. (2004.) Assessment of post-stroke quality of life in cost-effectiveness studies: The usefulness of the Barthel Index and the EuroQoL-5D.  Quality of Life Research, 15, 147-39         Occupational Therapy Evaluation Charge Determination   History Examination Decision-Making   LOW Complexity : Brief history review  HIGH Complexity : 5 or more performance deficits relating to physical, cognitive , or psychosocial skils that result in activity limitations and / or participation restrictions LOW Complexity : No comorbidities that affect functional and no verbal or physical assistance needed to complete eval tasks       Based on the above components, the patient evaluation is determined to be of the following complexity level: LOW   Pain Ratin/10- \"all over\"    Activity Tolerance:   Fair, desaturates with exertion and requires oxygen, requires frequent rest breaks, and observed SOB with activity    After treatment patient left in no apparent distress:    Supine in bed, Call bell within reach, and Bed / chair alarm activated    COMMUNICATION/EDUCATION:   The patients plan of care was discussed with: Physical therapist and Registered nurse. Home safety education was provided and the patient/caregiver indicated understanding., Patient/family have participated as able in goal setting and plan of care. , and Patient/family agree to work toward stated goals and plan of care. This patients plan of care is appropriate for delegation to [facility].     Thank you for this referral.  Shelly Lester OT  Time Calculation: 46 mins

## 2021-09-09 NOTE — PROGRESS NOTES
6 MINUTE WALK DONE BY RT AND OT. RA RESTING SAT 89%   RA STANDING SAT 88%   RA WALKING SAT 83% HR 94.   PT PUT BACK IN BED AND ADVISED TO PRONE. PT STATES SHE CANT PRONE.  PT LAYING ON HER SIDE SPO2 92% ON 6L N/C.

## 2021-09-09 NOTE — PROGRESS NOTES
MARAL   RUR 14%     IDR discussed in rounds with MD and team this am.   Six min walk test. Still SOB- on 6 liters post test   PT saw patient too weak to participate at this time. CM to follow for needs.    Patient most likely will need Home Oxygen, DME and HH   Or some type of inpatient rehab    [facility] MSW RN   [phone]

## 2021-09-09 NOTE — PROGRESS NOTES
Bedside and Verbal shift change report given to Cassandra Zapata 694 (oncoming nurse) by Dani Boudreaux RN (offgoing nurse). Report included the following information SBAR, Kardex, MAR and Recent Results.

## 2021-09-09 NOTE — PROGRESS NOTES
Cardiac Monitoring on 9.8.21  from [phone]    Tele Strip on 9.8.21 at 1900 - Sinus Tach              Tele Strip on 9.9.21 at 0300 - Sinus Rhythm

## 2021-09-09 NOTE — PROGRESS NOTES
Bedside and Verbal shift change report given to LINDA Machado (oncoming nurse) by Franklin Olguin RN (offgoing nurse). Report included the following information SBAR, Kardex, Intake/Output, MAR and Recent Results.

## 2021-09-09 NOTE — PROGRESS NOTES
Problem: Breathing Pattern - Ineffective  Goal: Ability to achieve and maintain a regular respiratory rate  Outcome: Progressing Towards Goal     Problem: Airway Clearance - Ineffective  Goal: Achieve or maintain patent airway  Outcome: Progressing Towards Goal     Problem: Risk for Fluid Volume Deficit  Goal: Maintain normal heart rhythm  Outcome: Progressing Towards Goal  Goal: Maintain absence of muscle cramping  Outcome: Progressing Towards Goal  Goal: Maintain normal serum potassium, sodium, calcium, phosphorus, and pH  Outcome: Progressing Towards Goal

## 2021-09-09 NOTE — PROGRESS NOTES
PT note: Attempted to see patient for PT eval. Patient laying in bed visibly fatigued and dyspneic at rest. O2 sats 95% on 4L HR  but drops to 92% just with talking. Not appropriate for PT eval at this time. Will continue to follow.  Estefania Phipps, PT

## 2021-09-10 ENCOUNTER — HOME HEALTH ADMISSION (OUTPATIENT)
Dept: HOME HEALTH SERVICES | Facility: HOME HEALTH | Age: 61
End: 2021-09-10
Payer: MEDICARE

## 2021-09-10 LAB
ANION GAP SERPL CALC-SCNC: 14 MMOL/L (ref 5–15)
BUN SERPL-MCNC: 64 MG/DL (ref 6–20)
BUN/CREAT SERPL: 21 (ref 12–20)
CALCIUM SERPL-MCNC: 9.8 MG/DL (ref 8.5–10.1)
CHLORIDE SERPL-SCNC: 111 MMOL/L (ref 97–108)
CO2 SERPL-SCNC: 20 MMOL/L (ref 21–32)
CREAT SERPL-MCNC: 3.08 MG/DL (ref 0.55–1.02)
CRP SERPL-MCNC: 4.19 MG/DL (ref 0–0.6)
FERRITIN SERPL-MCNC: 5875 NG/ML (ref 8–252)
GLUCOSE SERPL-MCNC: 101 MG/DL (ref 65–100)
POTASSIUM SERPL-SCNC: 4.6 MMOL/L (ref 3.5–5.1)
SODIUM SERPL-SCNC: 145 MMOL/L (ref 136–145)

## 2021-09-10 PROCEDURE — 74011250637 HC RX REV CODE- 250/637: Performed by: STUDENT IN AN ORGANIZED HEALTH CARE EDUCATION/TRAINING PROGRAM

## 2021-09-10 PROCEDURE — 86140 C-REACTIVE PROTEIN: CPT

## 2021-09-10 PROCEDURE — 77010033678 HC OXYGEN DAILY

## 2021-09-10 PROCEDURE — 74011250636 HC RX REV CODE- 250/636: Performed by: STUDENT IN AN ORGANIZED HEALTH CARE EDUCATION/TRAINING PROGRAM

## 2021-09-10 PROCEDURE — 80048 BASIC METABOLIC PNL TOTAL CA: CPT

## 2021-09-10 PROCEDURE — 65270000032 HC RM SEMIPRIVATE

## 2021-09-10 PROCEDURE — 74011250636 HC RX REV CODE- 250/636: Performed by: FAMILY MEDICINE

## 2021-09-10 PROCEDURE — 36415 COLL VENOUS BLD VENIPUNCTURE: CPT

## 2021-09-10 PROCEDURE — 82728 ASSAY OF FERRITIN: CPT

## 2021-09-10 RX ORDER — AMLODIPINE BESYLATE 5 MG/1
10 TABLET ORAL DAILY
Status: DISCONTINUED | OUTPATIENT
Start: 2021-09-10 | End: 2021-09-12 | Stop reason: HOSPADM

## 2021-09-10 RX ADMIN — HEPARIN SODIUM 5000 UNITS: 5000 INJECTION INTRAVENOUS; SUBCUTANEOUS at 14:34

## 2021-09-10 RX ADMIN — Medication 1 CAPSULE: at 09:55

## 2021-09-10 RX ADMIN — AMLODIPINE BESYLATE 10 MG: 5 TABLET ORAL at 14:34

## 2021-09-10 RX ADMIN — DEXAMETHASONE 6 MG: 4 TABLET ORAL at 09:55

## 2021-09-10 RX ADMIN — Medication 10 ML: at 04:11

## 2021-09-10 RX ADMIN — HEPARIN SODIUM 5000 UNITS: 5000 INJECTION INTRAVENOUS; SUBCUTANEOUS at 21:53

## 2021-09-10 RX ADMIN — HEPARIN SODIUM 5000 UNITS: 5000 INJECTION INTRAVENOUS; SUBCUTANEOUS at 04:10

## 2021-09-10 RX ADMIN — ALBUTEROL SULFATE 2 PUFF: 90 AEROSOL, METERED RESPIRATORY (INHALATION) at 14:39

## 2021-09-10 RX ADMIN — TAMSULOSIN HYDROCHLORIDE 0.4 MG: 0.4 CAPSULE ORAL at 09:55

## 2021-09-10 RX ADMIN — Medication 10 ML: at 21:53

## 2021-09-10 RX ADMIN — OXYCODONE HYDROCHLORIDE AND ACETAMINOPHEN 500 MG: 500 TABLET ORAL at 18:16

## 2021-09-10 RX ADMIN — OXYCODONE HYDROCHLORIDE AND ACETAMINOPHEN 500 MG: 500 TABLET ORAL at 09:55

## 2021-09-10 NOTE — PROGRESS NOTES
Planning  1/ PCP appointment arranged and on AVS  2/ [facility] to provide oxygen. They will bring a portable tank to the hospital.  3/ Order placed for bedside commode. Sent to [facility] via [employer]. 4/ Family can provide transportation home at time of discharge. MARAL  RUR 14    [phone]  JOSEFINA called into patient's room and discuss discharge plans. She stated that she lives with her sister, Antonia Kim [phone], who will be able to provide care for her at home. CM talked with patient about home oxygen needs and Freedom of Choice. Patient does not have a choice in oxygen providers. Freedom of choice letter placed on chart along with a list of DME providers. CM called Ms. Dorina Yanez and left a message for a return call. CM called patient's daughter, Mehnaz Bautistar [phone] and left a message for a return call. [address] [facility] will be able to provide oxygen at home. They will be able to deliver a portable tank to the hospital for the trip home. CM received return call from patient's daughter Angi Carlos. She stated that she lives in the home with patient and her children. Antonia Kim recently moved out of the apartment into her own place but has been caretaker to the family (they have all had COVID). CM informed daughter that oxygen has been ordered for patient at home. She stated that patient may need a bedside commode as they only have one bathroom and patient will need to be on isolation. Nurse called and spoke to Dr. Charles Maradiaga who is writing order for bedside commode.     TATI Panchal/JOSEFINA  [phone]

## 2021-09-10 NOTE — PROGRESS NOTES
shift change report given to LINDA Machado (oncoming nurse) by Tristan Viramontes RN (offgoing nurse). Report included the following information SBAR, Kardex, Procedure Summary, Intake/Output, MAR, Accordion, Recent Results and Cardiac Rhythm sinus tach. 2100: Patient resting in bed upon initial assessment, patient assisted to the bathroom and pt dyspneic upon walking. O2 at 87-89 on 4 liters, increased to 6 liters to compensate. Patient assisted back to bed with 6 liters and patients oxygen level noted to be 94-96%. Patient rested in bed for approx. 10-15 minutes before oxygen lowered back to 4L. O2 at 96-98% while resting. .  3090: VS obtained, patient denied being in  pain or SOB at this time. 0130: Patient resting in bed, chest rises and fall without difficulty. 0408: Labwork collected and walked to lab. Patient tolerated well. 2273: Patient assisted to bathroom, patient oxygen level desat to 85% on 4 liters of oxygen, patient encouraged to deep breathe and focus on breathing. Patients oxygen level bryan to 90%, then patients oxygen level sustained at 95% once settled into bed. shift change report given to LINDA Catalan (oncoming nurse) by Tessie Aburto RN (offgoing nurse). Report included the following information SBAR, Kardex, Procedure Summary, Intake/Output, MAR, Accordion, Recent Results and Med Rec Status.

## 2021-09-10 NOTE — PROGRESS NOTES
PT note: spoke to RN patient remains very dyspneic with minimal exertion. Not appropriate for therapy today. Will continue to follow.  Zackary Austin, PT

## 2021-09-10 NOTE — PROGRESS NOTES
Hospitalist Progress Note    NAME: Joshua Acevedo   :  1960   MRN:  [id number]   Room Number:  604/66  @ [facility]       Interim Hospital Summary: 64 y.o. female whom presented on 2021 with      Assessment / Plan:    Acute hypoxic respiratory failure due to COVID-19 solved  COVID-19 pneumonia severe  Severe sepsis  -Patient was saturating 88 to 90% on room air  -On admission temperature 102, heart rate above 100 respiratory rate 33  -Chest x-ray reviewed independently consistent with severe COVID-19 pneumonia  -COVID-19 PCR positive, CRP 1.57 > 1.42 , procalcitonin 0.54, lactic acid within normal limit  troponin 0.05, ferritin 28 687  PCT 0.5> 0.25 > 0.14     -Vitamin C and zinc  -Dexamethasone 6 mg for 10 days  -Voluntary prone positioning  -Lovenox per protocol  -Trend symmetry markers  -Ceftriaxone and azithromycin for community-acquired pneumonia -Antitussive  -Antipyretic  -Hold off on remdesivir given advanced age CKD  -Supplemental oxygen to keep above SPO2 94  -Patient could not tolerate weaning off of oxygen and 6-minute walk test today.  -Patient will require supplemental oxygen for home        JADE on CKD 4 resolved   -Patient follows [facility] nephrology last creatinine in in [facility] was 3.94  -On admission creatinine 5.03 down to 3.09  S/p 1 L NS   -Monitor creatinine daily  -Hold losartan and Diamox  -Ins and outs  -Renal dose medication  -Avoid nephrotoxic medication        Hypertension  -Takes amlodipine, metoprolol losartan and Diamox at home  -Resume amlodipine      History of gout on allopurinol at home     History history of AAA follows with vascular surgery as outpatient           Code status: Full  Prophylaxis: Lovenox  Recommended Disposition: Home w/Family             Subjective:     Chief Complaint / Reason for Physician Visit  Continues to have cough and shortness of breath. Discussed with RN events overnight.      Review of Systems:  No fevers, chills, appetite change,  sputum production,nausea, vomitting, diarrhea, constipation, chest pain, leg edema, abdominal pain, joint pain, rash, itching. Tolerating PT/OT. Tolerating diet. Objective:     VITALS:   Last 24hrs VS reviewed since prior progress note. Most recent are:  Patient Vitals for the past 24 hrs:   Temp Pulse Resp BP SpO2   09/10/21 0857 98.2 °F (36.8 °C) 88 17 (!) 143/82 98 %   09/10/21 0402 (!) 96.2 °F (35.7 °C) 72 18 (!) 157/91 96 %   09/09/21 2354 96.8 °F (36 °C) (!) 108 20 (!) 148/97 97 %   09/09/21 2054 97 °F (36.1 °C) (!) 110 20 129/84 93 %   09/09/21 1700     94 %   09/09/21 1630     92 %   09/09/21 1537     94 %       Intake/Output Summary (Last 24 hours) at 9/10/2021 1057  Last data filed at 9/9/2021 1826  Gross per 24 hour   Intake 240 ml   Output    Net 240 ml        PHYSICAL EXAM:  General: Alert, cooperative, no acute distress    EENT:  EOMI. Anicteric sclerae. MMM  Resp:  CTA bilaterally, no wheezing or rales. No accessory muscle use  CV:  Regular  rhythm,  normal S1/S2, no murmurs rubs gallops, No edema  GI:  Soft, Non distended, Non tender. +Bowel sounds  Neurologic:  Alert and oriented X 3, normal speech,   Psych:   Good insight. Not anxious nor agitated  Skin:  No rashes. No jaundice    Reviewed most current lab test results and cultures  YES  Reviewed most current radiology test results   YES  Review and summation of old records today    NO  Reviewed patient's current orders and MAR    YES  PMH/SH reviewed - no change compared to H&P  ________________________________________________________________________  Care Plan discussed with:    Comments   Patient x    Family      RN x    Care Manager x    Consultant                       x Multidiciplinary team rounds were held today with , nursing, pharmacist and clinical coordinator. Patient's plan of care was discussed; medications were reviewed and discharge planning was addressed. ________________________________________________________________________  Total NON critical care TIME: 35  Minutes    Total CRITICAL CARE TIME Spent:   Minutes non procedure based      Comments   >50% of visit spent in counseling and coordination of care     ________________________________________________________________________  Conrad Ga MD     Procedures: see electronic medical records for all procedures/Xrays and details which were not copied into this note but were reviewed prior to creation of Plan. LABS:  I reviewed today's most current labs and imaging studies.   Pertinent labs include:  Recent Labs     09/08/21  0330   WBC 13.8*   HGB 8.8*   HCT 28.7*        Recent Labs     09/10/21  0408 09/09/21  0827 09/08/21  0330    148* 148*   K 4.6 4.1 4.1   * 112* 113*   CO2 20* 19* 18*   * 97 156*   BUN 64* 67* 75*   CREA 3.08* 2.90* 3.11*   CA 9.8 9.9 9.0       Signed: Conrad Ga MD

## 2021-09-11 PROCEDURE — 65270000032 HC RM SEMIPRIVATE

## 2021-09-11 PROCEDURE — 74011250636 HC RX REV CODE- 250/636: Performed by: FAMILY MEDICINE

## 2021-09-11 PROCEDURE — 74011250636 HC RX REV CODE- 250/636: Performed by: STUDENT IN AN ORGANIZED HEALTH CARE EDUCATION/TRAINING PROGRAM

## 2021-09-11 PROCEDURE — 74011250637 HC RX REV CODE- 250/637: Performed by: STUDENT IN AN ORGANIZED HEALTH CARE EDUCATION/TRAINING PROGRAM

## 2021-09-11 PROCEDURE — 77010033678 HC OXYGEN DAILY

## 2021-09-11 RX ADMIN — OXYCODONE HYDROCHLORIDE AND ACETAMINOPHEN 500 MG: 500 TABLET ORAL at 17:01

## 2021-09-11 RX ADMIN — Medication 10 ML: at 21:55

## 2021-09-11 RX ADMIN — TAMSULOSIN HYDROCHLORIDE 0.4 MG: 0.4 CAPSULE ORAL at 08:29

## 2021-09-11 RX ADMIN — DEXAMETHASONE 6 MG: 4 TABLET ORAL at 08:29

## 2021-09-11 RX ADMIN — AMLODIPINE BESYLATE 10 MG: 5 TABLET ORAL at 08:28

## 2021-09-11 RX ADMIN — Medication 1 CAPSULE: at 08:29

## 2021-09-11 RX ADMIN — Medication 10 ML: at 13:55

## 2021-09-11 RX ADMIN — HEPARIN SODIUM 5000 UNITS: 5000 INJECTION INTRAVENOUS; SUBCUTANEOUS at 13:55

## 2021-09-11 RX ADMIN — OXYCODONE HYDROCHLORIDE AND ACETAMINOPHEN 500 MG: 500 TABLET ORAL at 08:29

## 2021-09-11 RX ADMIN — HEPARIN SODIUM 5000 UNITS: 5000 INJECTION INTRAVENOUS; SUBCUTANEOUS at 21:54

## 2021-09-11 RX ADMIN — HEPARIN SODIUM 5000 UNITS: 5000 INJECTION INTRAVENOUS; SUBCUTANEOUS at 05:56

## 2021-09-11 RX ADMIN — Medication 10 ML: at 05:57

## 2021-09-11 NOTE — PROGRESS NOTES
Shift report given outside room d/t isolation precautions. Report given to Virgil Orourke RN (oncoming) from Tyrel Wild RN (offgoing). Report included the following: SBAR, MAR, Kardex, Intake/Output, and Cardiac Rhythm of Sinus Tach. Quick eyes on pt. No complaints. On cellphone.

## 2021-09-11 NOTE — PROGRESS NOTES
Hospitalist Progress Note    NAME: Evin Steward   :  1960   MRN:  [id number]   Room Number:  106  @ [facility] Summary: 64 y.o. female whom presented on 2021 with      Assessment / Plan:  DC to home anticipated tomorrow. Acute hypoxic respiratory failure due to COVID-19 solved  COVID-19 pneumonia severe  Severe sepsis  -Patient was saturating 88 to 90% on room air  -On admission temperature 102, heart rate above 100 respiratory rate 33  -Chest x-ray reviewed independently consistent with severe COVID-19 pneumonia  -COVID-19 PCR positive, CRP 1.57 > 1.42 , procalcitonin 0.54, lactic acid within normal limit  troponin 0.05, ferritin 28 687  PCT 0.5> 0.25 > 0.14     -Vitamin C and zinc  -Dexamethasone 6 mg for 10 days  -Voluntary prone positioning  -Lovenox per protocol  -Trend symmetry markers  -Ceftriaxone and azithromycin for community-acquired pneumonia -Antitussive  -Antipyretic  -Hold off on remdesivir given advanced age CKD  -Supplemental oxygen to keep above SPO2 94  -Patient will require supplemental oxygen for home 4L, has been arranged by JOSEFINA        JADE on CKD 4 resolved   -[facility] 3.94  -On admission creatinine 5.03 down to 3.09  S/p 1 L NS   -Monitor creatinine daily  -Hold losartan and Diamox  -Ins and outs  -Renal dose medication  -Avoid nephrotoxic medication        Hypertension  -Takes amlodipine, metoprolol losartan and Diamox at home  -Resume amlodipine        History of gout on allopurinol at home     History history of AAA follows with vascular surgery as outpatient           Code status: Full  Prophylaxis: Lovenox  Recommended Disposition: Home w/Family                Subjective:     Chief Complaint / Reason for Physician Visit  \"I feel better\". Discussed with RN events overnight.      Review of Systems:  No fevers, chills, appetite change, cough, sputum production, shortness of breath,, nausea, vomitting, diarrhea, constipation, chest pain, leg edema, abdominal pain, joint pain, rash, itching. Tolerating PT/OT. Tolerating diet. Objective:     VITALS:   Last 24hrs VS reviewed since prior progress note. Most recent are:  Patient Vitals for the past 24 hrs:   Temp Pulse Resp BP SpO2   09/11/21 0807 (!) 96.6 °F (35.9 °C) 79 20 136/68 91 %   09/11/21 0306 (!) 96.7 °F (35.9 °C) 96 20 (!) 145/79 95 %   09/10/21 2333 (!) 96.7 °F (35.9 °C) 98 20 (!) 147/63 94 %   09/10/21 1942 98 °F (36.7 °C) (!) 103 22 137/77 93 %   09/10/21 1508 (!) 96.4 °F (35.8 °C) (!) 101 24 135/80 94 %   09/10/21 1231 (!) 96 °F (35.6 °C) (!) 112 18 130/72 93 %       Intake/Output Summary (Last 24 hours) at 9/11/2021 1144  Last data filed at 9/10/2021 2145  Gross per 24 hour   Intake    Output 250 ml   Net -250 ml        PHYSICAL EXAM:  General:  Alert, cooperative, no acute distress    EENT:  EOMI. Anicteric sclerae. MMM  Resp:  Bibasilar rales No accessory muscle use  CV:  Regular  rhythm,  normal S1/S2, no murmurs rubs gallops, No edema  GI:  Soft, Non distended, Non tender. +Bowel sounds  Neurologic:  Alert and oriented X 3, normal speech,   Psych:   Good insight. Not anxious nor agitated  Skin:  No rashes. No jaundice    Reviewed most current lab test results and cultures  YES  Reviewed most current radiology test results   YES  Review and summation of old records today    NO  Reviewed patient's current orders and MAR    YES  PMH/SH reviewed - no change compared to H&P  ________________________________________________________________________  Care Plan discussed with:    Comments   Patient x    Family      RN x    Care Manager x    Consultant                       x Multidiciplinary team rounds were held today with , nursing, pharmacist and clinical coordinator. Patient's plan of care was discussed; medications were reviewed and discharge planning was addressed. ________________________________________________________________________  Total NON critical care TIME:  25 Minutes    Total CRITICAL CARE TIME Spent:   Minutes non procedure based      Comments   >50% of visit spent in counseling and coordination of care     ________________________________________________________________________  Gladis Sinclair MD     Procedures: see electronic medical records for all procedures/Xrays and details which were not copied into this note but were reviewed prior to creation of Plan. LABS:  I reviewed today's most current labs and imaging studies. Pertinent labs include:  No results for input(s): WBC, HGB, HCT, PLT, HGBEXT, HCTEXT, PLTEXT in the last 72 hours.   Recent Labs     09/10/21  0408 09/09/21  0827    148*   K 4.6 4.1   * 112*   CO2 20* 19*   * 97   BUN 64* 67*   CREA 3.08* 2.90*   CA 9.8 9.9       Signed: Gladis Sinclair MD

## 2021-09-11 NOTE — PROGRESS NOTES
Problem: Gas Exchange - Impaired  Goal: Absence of hypoxia  Outcome: Progressing Towards Goal  Goal: Promote optimal lung function  Outcome: Progressing Towards Goal     Problem: Breathing Pattern - Ineffective  Goal: Ability to achieve and maintain a regular respiratory rate  Outcome: Progressing Towards Goal numerical 0-10

## 2021-09-11 NOTE — PROGRESS NOTES
0710 Shift report given outside room d/t isolation precautions. Report given to Sushant Mcclure RN (oncoming) from LINDA Jones (offgoing). Report included the following: SBAR, MAR, Kardex, Intake/Output, and Cardiac Rhythm of Sinus Tach. 0807 VS stable, morning assessment. Standby assist while pt up to bedside commode     0829 Pt accepted all morning meds     1151 VS stable, no changes to note. Pt resting in bed, denies any complaints     1222 Home O2 and walker dropped off. Education complete on how to use home oxygen, pt verbalized understanding & denied any further questions     1355 Pt resting in bed, denies any needs     1556 VS stable, reassessment complete. No changes to note. Pt denies any needs    1701 Pt set up w/ dinner tray. Denies any needs at this time     200 Standby assist while pt up to bedside commode. 1910 Shift report given outside room d/t isolation precautions. Report given to Chico Cerrato RN (oncoming) from Sushant Mcclure RN (offgoing). Report included the following: SBAR, MAR, Kardex, Intake/Output, and Cardiac Rhythm of Sinus Tach.

## 2021-09-11 NOTE — PROGRESS NOTES
1900 Report received, pt in bed, alert and oriented, denies pain, side rails up x 2, assisted pt to bsc, pt tolerated well, 02 dropped to 86 but quickly rebounded once in bed    2000 Pt given incentive spirometer and educated on use, pt verbalized understanding    0000 No changes at this time, pt in bed watching TV, denies pain or other needs at this time    0400 No other changes at this time, lab draws unsuccessful x 2

## 2021-09-11 NOTE — PROGRESS NOTES
MARAL  RUR: 13%    1222  Aaron delivered portable tank to unit. Delivery individual gave education to RN about how to use oxygen tank and how to educate patient on it. We also needed a walker for the patient.  had extra walker in [facility] [place], offered to take the order and face to face and give us a walker on site. Order, face to face given to . He delivered the walker which was given to the patient. 0930  CM called [facility] this morning to confirm patients oxygen delivery. Patient anticipated discharge is 9/12. A portable oxygen tank is to be delivered to the hospital today 9/11 and they will do the home set up for the patient once patient is home tomorrow 9/12.      Abner Hickman, ADRIANN, [facility]  [phone]

## 2021-09-12 VITALS
DIASTOLIC BLOOD PRESSURE: 67 MMHG | HEIGHT: 67 IN | HEART RATE: 104 BPM | SYSTOLIC BLOOD PRESSURE: 127 MMHG | OXYGEN SATURATION: 93 % | BODY MASS INDEX: 23.53 KG/M2 | TEMPERATURE: 96.9 F | WEIGHT: 149.91 LBS | RESPIRATION RATE: 20 BRPM

## 2021-09-12 PROCEDURE — 74011250637 HC RX REV CODE- 250/637: Performed by: STUDENT IN AN ORGANIZED HEALTH CARE EDUCATION/TRAINING PROGRAM

## 2021-09-12 PROCEDURE — 74011250636 HC RX REV CODE- 250/636: Performed by: STUDENT IN AN ORGANIZED HEALTH CARE EDUCATION/TRAINING PROGRAM

## 2021-09-12 PROCEDURE — 74011250636 HC RX REV CODE- 250/636: Performed by: FAMILY MEDICINE

## 2021-09-12 PROCEDURE — 77010033678 HC OXYGEN DAILY

## 2021-09-12 RX ORDER — ASCORBIC ACID 500 MG
500 TABLET ORAL 2 TIMES DAILY
Qty: 4 TABLET | Refills: 0 | Status: SHIPPED | OUTPATIENT
Start: 2021-09-12 | End: 2021-09-14

## 2021-09-12 RX ORDER — TAMSULOSIN HYDROCHLORIDE 0.4 MG/1
0.4 CAPSULE ORAL DAILY
Qty: 30 CAPSULE | Refills: 0 | Status: SHIPPED | OUTPATIENT
Start: 2021-09-13

## 2021-09-12 RX ORDER — DEXAMETHASONE 6 MG/1
6 TABLET ORAL
Qty: 2 TABLET | Refills: 0 | Status: SHIPPED | OUTPATIENT
Start: 2021-09-12 | End: 2021-09-14

## 2021-09-12 RX ORDER — ZINC SULFATE 50(220)MG
1 CAPSULE ORAL DAILY
Qty: 1 CAPSULE | Refills: 0 | Status: SHIPPED | OUTPATIENT
Start: 2021-09-13 | End: 2021-09-14

## 2021-09-12 RX ORDER — METOPROLOL TARTRATE 50 MG/1
25 TABLET ORAL 2 TIMES DAILY
Qty: 30 TABLET | Refills: 0 | Status: SHIPPED | OUTPATIENT
Start: 2021-09-12

## 2021-09-12 RX ADMIN — Medication 1 CAPSULE: at 08:08

## 2021-09-12 RX ADMIN — HEPARIN SODIUM 5000 UNITS: 5000 INJECTION INTRAVENOUS; SUBCUTANEOUS at 05:17

## 2021-09-12 RX ADMIN — AMLODIPINE BESYLATE 10 MG: 5 TABLET ORAL at 08:08

## 2021-09-12 RX ADMIN — TAMSULOSIN HYDROCHLORIDE 0.4 MG: 0.4 CAPSULE ORAL at 08:08

## 2021-09-12 RX ADMIN — OXYCODONE HYDROCHLORIDE AND ACETAMINOPHEN 500 MG: 500 TABLET ORAL at 08:08

## 2021-09-12 RX ADMIN — DEXAMETHASONE 6 MG: 4 TABLET ORAL at 08:09

## 2021-09-12 RX ADMIN — Medication 10 ML: at 05:17

## 2021-09-12 NOTE — PROGRESS NOTES
Pharmacist Discharge Medication Reconciliation    Discharge Provider:  General Schroeder       Discharge Medications:      My Medications        START taking these medications        Instructions Each Dose to Equal Morning Noon Evening Bedtime   ascorbic acid (vitamin C) 500 mg tablet  Commonly known as: VITAMIN C      Take 1 Tablet by mouth two (2) times a day for 2 days. 500 mg         dexAMETHasone 6 mg tablet  Commonly known as: DECADRON      Take 1 Tablet by mouth Daily (before breakfast) for 2 days. 6 mg         tamsulosin 0.4 mg capsule  Commonly known as: FLOMAX  Start taking on: September 13, 2021      Take 1 Capsule by mouth daily. 0.4 mg         zinc sulfate 50 mg zinc (220 mg) capsule  Commonly known as: ZINCATE  Start taking on: September 13, 2021      Take 1 Capsule by mouth daily for 1 day. 1 Capsule                CHANGE how you take these medications        Instructions Each Dose to Equal Morning Noon Evening Bedtime   metoprolol tartrate 50 mg tablet  Commonly known as: LOPRESSOR  What changed: how much to take      Take 0.5 Tablets by mouth two (2) times a day. 25 mg                CONTINUE taking these medications        Instructions Each Dose to Equal Morning Noon Evening Bedtime   amLODIPine 10 mg tablet  Commonly known as: NORVASC      Take 10 mg by mouth daily.    10 mg                STOP taking these medications      allopurinoL 100 mg tablet  Commonly known as: ZYLOPRIM        losartan 50 mg tablet  Commonly known as: COZAAR                  Where to Get Your Medications        These medications were sent to [facility] [address] [address]      Phone: [phone]   · ascorbic acid (vitamin C) 500 mg tablet  · dexAMETHasone 6 mg tablet  · metoprolol tartrate 50 mg tablet  · tamsulosin 0.4 mg capsule  · zinc sulfate 50 mg zinc (220 mg) capsule        Thank you,  Bettie Mahoney, PharmD, BCPS  [phone]

## 2021-09-12 NOTE — PROGRESS NOTES
Physical Therapy:     Chart reviewed. Spoke with RN; patient is discharging home with family and walker today. She has been using bathroom independently. Will complete PT order as there are no acute PT needs at this time.      Cynthia Richter, PT

## 2021-09-12 NOTE — PROGRESS NOTES
Problem: Isolation Precautions - Risk of Spread of Infection  Goal: Prevent transmission of infectious organism to others  Outcome: Progressing Towards Goal     Problem: Loneliness or Risk for Loneliness  Goal: Demonstrate positive use of time alone when socialization is not possible  Outcome: Progressing Towards Goal     Problem: Fatigue  Goal: Verbalize increase energy and improved vitality  Outcome: Progressing Towards Goal     Problem: Breathing Pattern - Ineffective  Goal: Ability to achieve and maintain a regular respiratory rate  Outcome: Progressing Towards Goal     Problem: Patient Education: Go to Patient Education Activity  Goal: Patient/Family Education  Outcome: Progressing Towards Goal

## 2021-09-12 NOTE — DISCHARGE SUMMARY
Hospitalist Discharge Summary     Patient ID:  Tali Maloney  [id number]  13 y.o.  1960    PCP on record: None    Admit date: 9/4/2021  Discharge date and time: 9/12/2021      Admission Diagnoses: COVID-19 [U07.1]    Discharge Diagnoses: Active Problems:    COVID-19 (9/4/2021)           Hospital Course:       Acute hypoxic respiratory failure due to COVID-19 solved  COVID-19 pneumonia severe  Severe sepsis  -Patient was saturating 88 to 90% on room air  -On admission temperature 102, heart rate above 100 respiratory rate 33  -Chest x-ray reviewed independently consistent with severe COVID-19 pneumonia  -COVID-19 PCR positive, CRP 1.57 > 1.42 , procalcitonin 0.54, lactic acid within normal limit  troponin 0.05, ferritin 28 687  PCT 0.5> 0.25 > 0.14     -Vitamin C and zinc  -Dexamethasone 6 mg for 10 days  -Patient will require supplemental oxygen for home 4L, has been arranged by JOSEFINA        JADE on CKD 4 resolved   -Patient follows [facility] nephrology [facility] was 3.94  -On admission creatinine 5.03 down to 3.09  S/p 1 L NS   -Monitor creatinine daily  -Hold losartan and Diamox  -Ins and outs  -Renal dose medication  -Avoid nephrotoxic medication        Hypertension  -Takes amlodipine, metoprolol losartan and Diamox at home  -Resume amlodipine        History of gout on allopurinol at home     History history of AAA follows with vascular surgery as outpatient        CONSULTATIONS:  None    Excerpted HPI from H&P of Josefina Hunter MD:     64 y.o.  female whom presents with complaint noted above.  Patient reports worsening dyspnea over the course of the past 3 days; she has been experiencing an associated cough; she endorses a loss of taste and smell; vaccination status not known to me.      ______________________________________________________________________  DISCHARGE SUMMARY/HOSPITAL COURSE:  for full details see H&P, daily progress notes, labs, consult notes.     Visit Vitals  /67 (BP 1 Location: Right upper arm, BP Patient Position: At rest)   Pulse (!) 104   Temp 96.9 °F (36.1 °C)   Resp 20   Ht 5' 7\" (1.702 m)   Wt 68 kg (149 lb 14.6 oz)   SpO2 93%   BMI 23.48 kg/m²       _______________________________________________________________________  Patient seen and examined by me on discharge day. Pertinent Findings:  Gen:    Not in distress  Chest: Clear lungs  CVS:   Regular rhythm. No edema  Abd:  Soft, not distended, not tender  Neuro:  Alert with good insight. Oriented to person, place, and time   _______________________________________________________________________  DISCHARGE MEDICATIONS:   Current Discharge Medication List      START taking these medications    Details   dexAMETHasone (DECADRON) 6 mg tablet Take 1 Tablet by mouth Daily (before breakfast) for 2 days. Qty: 2 Tablet, Refills: 0  Start date: 9/12/2021, End date: 9/14/2021      tamsulosin (FLOMAX) 0.4 mg capsule Take 1 Capsule by mouth daily. Qty: 30 Capsule, Refills: 0  Start date: 9/13/2021      zinc sulfate (ZINCATE) 50 mg zinc (220 mg) capsule Take 1 Capsule by mouth daily for 1 day. Qty: 1 Capsule, Refills: 0  Start date: 9/13/2021, End date: 9/14/2021      ascorbic acid, vitamin C, (VITAMIN C) 500 mg tablet Take 1 Tablet by mouth two (2) times a day for 2 days. Qty: 4 Tablet, Refills: 0  Start date: 9/12/2021, End date: 9/14/2021         CONTINUE these medications which have NOT CHANGED    Details   amLODIPine (NORVASC) 10 mg tablet Take 10 mg by mouth daily.          STOP taking these medications       allopurinoL (ZYLOPRIM) 100 mg tablet Comments:   Reason for Stopping:         losartan (COZAAR) 50 mg tablet Comments:   Reason for Stopping:         metoprolol tartrate (LOPRESSOR) 50 mg tablet Comments:   Reason for Stopping:               My Recommended Diet, Activity, Wound Care, and follow-up labs are listed in the patient's Discharge Insturctions which I have personally completed and reviewed. _______________________________________________________________________  DISPOSITION:     Home with Family: x   Home with HH/PT/OT/RN:    SNF/LTC:    DEBRA:    OTHER:        Condition at Discharge:  Stable  _______________________________________________________________________  Follow up with:   PCP : None  Follow-up Information     Follow up With Specialties Details Why Contact [facility]  On 9/17/2021 Appt with Sedonia Galeazzi, 8:15AM, , Please arrive 15 mins prior, Please take ID and insurance card, Please wear a mask [phone]  [address] [place] [address] Services On 9/11/2021 [address] will be providing oxygen at home. Please call when you are leaving so they can diliver your larger tank at home. They will be providing a tank to the hospital for use at home.  [address] [address]  The [facility] to call you for start of care  [facility] [phone]  [phone]    None    None (730) Patient stated that they have no PCP                Total time in minutes spent coordinating this discharge (includes going over instructions, follow-up, prescriptions, and preparing report for sign off to her PCP) : 32 minutes    Signed:  Khari Link MD

## 2021-09-12 NOTE — PROGRESS NOTES
0710 Bedside shift report given to Martha Dumont RN (oncoming) from LINDA Briceno (offgoing). Report included the following: SBAR, MAR, Kardex, and Intake/Output. 0807 VS stable, morning assessment complete. Pt on 4L NC. Pt accepted all morning meds    0902 Pt set up w/ breakfast tray     1045 Pt stated SOB, this RN went in and encouraged pursed lip breathing, O2 sats 90-92% after. Pt also states just getting up to use restroom. 1230 Pt set up w/ lunch tray. IV access removed d/t pending dc     1327 Dc teaching complete. Pt educated on medications to start and stop. Pt also educated on treatment after hospital & follow-up appointments. Pt verbalized understanding & denied any further questions. Awaiting ride     1510 Pt assisted w/ getting ready to leave.  Taken down via wheelchair w/ [facility], RN and home oxygen & walker

## 2021-09-13 ENCOUNTER — PATIENT OUTREACH (OUTPATIENT)
Dept: CASE MANAGEMENT | Age: 61
End: 2021-09-13

## 2021-09-13 ENCOUNTER — TELEPHONE (OUTPATIENT)
Dept: CASE MANAGEMENT | Age: 61
End: 2021-09-13

## 2021-09-13 NOTE — TELEPHONE ENCOUNTER
MARAL   Care Management Follow-up call   Was unable to reach patient this am   To do second attempt.  May need to call the daughter # instead     Arie HERRERA RN   [phone]

## 2021-09-14 ENCOUNTER — HOME CARE VISIT (OUTPATIENT)
Dept: SCHEDULING | Facility: HOME HEALTH | Age: 61
End: 2021-09-14

## 2021-09-14 PROCEDURE — 400018 HH-NO PAY CLAIM PROCEDURE

## 2021-09-14 NOTE — PROGRESS NOTES
Care Transitions Outreach Attempt    Call within 2 business days of discharge: Yes   Attempted to reach patient for transitions of care follow up. Unable to reach patient. Patient: Bobby Ortez Patient : 1960 MRN: [id number]    Last Discharge [address]       Complaint Diagnosis Description Type Department Provider    21 Positive For Covid-19 Pneumonia due to COVID-19 virus . .. ED to Hosp-Admission (Discharged) (ADMIT) Tereso Hudson MD; Mar Reynolds,... Was this an external facility discharge? No    Noted following upcoming appointments from discharge chart review: pcp appt  [facility] follow up appointment(s):   Future Appointments   Date Time Provider Ryan Parrish   2021 12:00 PM Adrian Omalley PT [facility]     Non-[facility] follow up appointment(s): n/a    /21  Unable to reach patient for ALIA JOSUE call. Ctn to attempt to reach patient in 5 business days.  AR

## 2021-09-23 ENCOUNTER — PATIENT OUTREACH (OUTPATIENT)
Dept: CASE MANAGEMENT | Age: 61
End: 2021-09-23

## 2021-09-23 NOTE — PROGRESS NOTES
09/23/21  Unable to reach patient for [facility] call. Episode resolved at this time.  AR

## 2022-03-19 PROBLEM — U07.1 COVID-19: Status: ACTIVE | Noted: 2021-09-04

## 2023-05-12 RX ORDER — METOPROLOL TARTRATE 50 MG/1
TABLET, FILM COATED ORAL 2 TIMES DAILY
COMMUNITY
Start: 2021-09-12

## 2023-05-12 RX ORDER — AMLODIPINE BESYLATE 10 MG/1
10 TABLET ORAL DAILY
COMMUNITY
Start: 2021-06-18

## 2023-05-12 RX ORDER — TAMSULOSIN HYDROCHLORIDE 0.4 MG/1
1 CAPSULE ORAL DAILY
COMMUNITY
Start: 2021-09-13